# Patient Record
Sex: MALE | Race: BLACK OR AFRICAN AMERICAN | Employment: FULL TIME | URBAN - METROPOLITAN AREA
[De-identification: names, ages, dates, MRNs, and addresses within clinical notes are randomized per-mention and may not be internally consistent; named-entity substitution may affect disease eponyms.]

---

## 2019-02-04 ENCOUNTER — HOSPITAL ENCOUNTER (OUTPATIENT)
Dept: PREADMISSION TESTING | Age: 54
Discharge: HOME | End: 2019-02-04
Payer: COMMERCIAL

## 2019-02-04 VITALS
HEART RATE: 66 BPM | BODY MASS INDEX: 36.77 KG/M2 | DIASTOLIC BLOOD PRESSURE: 84 MMHG | WEIGHT: 248.25 LBS | HEIGHT: 69 IN | SYSTOLIC BLOOD PRESSURE: 144 MMHG | TEMPERATURE: 97.6 F

## 2019-02-04 LAB
ABO + RH BLD: NORMAL
ANION GAP SERPL CALC-SCNC: 10 MMOL/L (ref 5–15)
APPEARANCE UR: CLEAR
ATRIAL RATE: 62 BPM
BACTERIA URNS QL MICRO: NEGATIVE /HPF
BILIRUB UR QL: NEGATIVE
BLOOD GROUP ANTIBODIES SERPL: NORMAL
BUN SERPL-MCNC: 15 MG/DL (ref 6–20)
BUN/CREAT SERPL: 16 (ref 12–20)
CALCIUM SERPL-MCNC: 9.1 MG/DL (ref 8.5–10.1)
CALCULATED P AXIS, ECG09: 34 DEGREES
CALCULATED R AXIS, ECG10: 19 DEGREES
CALCULATED T AXIS, ECG11: 9 DEGREES
CHLORIDE SERPL-SCNC: 107 MMOL/L (ref 97–108)
CO2 SERPL-SCNC: 26 MMOL/L (ref 21–32)
COLOR UR: NORMAL
CREAT SERPL-MCNC: 0.96 MG/DL (ref 0.7–1.3)
DIAGNOSIS, 93000: NORMAL
EPITH CASTS URNS QL MICRO: NORMAL /LPF
ERYTHROCYTE [DISTWIDTH] IN BLOOD BY AUTOMATED COUNT: 11.9 % (ref 11.5–14.5)
EST. AVERAGE GLUCOSE BLD GHB EST-MCNC: NORMAL MG/DL
GLUCOSE SERPL-MCNC: 93 MG/DL (ref 65–100)
GLUCOSE UR STRIP.AUTO-MCNC: NEGATIVE MG/DL
HBA1C MFR BLD: 4.7 % (ref 4.2–6.3)
HCT VFR BLD AUTO: 37.9 % (ref 36.6–50.3)
HGB BLD-MCNC: 12 G/DL (ref 12.1–17)
HGB UR QL STRIP: NEGATIVE
HYALINE CASTS URNS QL MICRO: NORMAL /LPF (ref 0–5)
INR PPP: 1.1 (ref 0.9–1.1)
KETONES UR QL STRIP.AUTO: NEGATIVE MG/DL
LEUKOCYTE ESTERASE UR QL STRIP.AUTO: NEGATIVE
MCH RBC QN AUTO: 28.1 PG (ref 26–34)
MCHC RBC AUTO-ENTMCNC: 31.7 G/DL (ref 30–36.5)
MCV RBC AUTO: 88.8 FL (ref 80–99)
NITRITE UR QL STRIP.AUTO: NEGATIVE
NRBC # BLD: 0 K/UL (ref 0–0.01)
NRBC BLD-RTO: 0 PER 100 WBC
P-R INTERVAL, ECG05: 132 MS
PH UR STRIP: 5.5 [PH] (ref 5–8)
PLATELET # BLD AUTO: 210 K/UL (ref 150–400)
PMV BLD AUTO: 11.3 FL (ref 8.9–12.9)
POTASSIUM SERPL-SCNC: 4.2 MMOL/L (ref 3.5–5.1)
PROT UR STRIP-MCNC: NEGATIVE MG/DL
PROTHROMBIN TIME: 10.9 SEC (ref 9–11.1)
Q-T INTERVAL, ECG07: 448 MS
QRS DURATION, ECG06: 88 MS
QTC CALCULATION (BEZET), ECG08: 454 MS
RBC # BLD AUTO: 4.27 M/UL (ref 4.1–5.7)
RBC #/AREA URNS HPF: NORMAL /HPF (ref 0–5)
SODIUM SERPL-SCNC: 143 MMOL/L (ref 136–145)
SP GR UR REFRACTOMETRY: 1.02 (ref 1–1.03)
SPECIMEN EXP DATE BLD: NORMAL
UA: UC IF INDICATED,UAUC: NORMAL
UROBILINOGEN UR QL STRIP.AUTO: 0.2 EU/DL (ref 0.2–1)
VENTRICULAR RATE, ECG03: 62 BPM
WBC # BLD AUTO: 4.1 K/UL (ref 4.1–11.1)
WBC URNS QL MICRO: NORMAL /HPF (ref 0–4)

## 2019-02-04 PROCEDURE — 81001 URINALYSIS AUTO W/SCOPE: CPT

## 2019-02-04 PROCEDURE — 85027 COMPLETE CBC AUTOMATED: CPT

## 2019-02-04 PROCEDURE — 36415 COLL VENOUS BLD VENIPUNCTURE: CPT

## 2019-02-04 PROCEDURE — 85610 PROTHROMBIN TIME: CPT

## 2019-02-04 PROCEDURE — 80048 BASIC METABOLIC PNL TOTAL CA: CPT

## 2019-02-04 PROCEDURE — 93005 ELECTROCARDIOGRAM TRACING: CPT

## 2019-02-04 PROCEDURE — 83036 HEMOGLOBIN GLYCOSYLATED A1C: CPT

## 2019-02-04 PROCEDURE — 86900 BLOOD TYPING SEROLOGIC ABO: CPT

## 2019-02-04 RX ORDER — COLCHICINE 0.6 MG/1
0.12 TABLET ORAL DAILY
COMMUNITY

## 2019-02-04 RX ORDER — PREDNISONE 5 MG/1
5 TABLET ORAL
COMMUNITY

## 2019-02-04 RX ORDER — AMPICILLIN TRIHYDRATE 250 MG
600 CAPSULE ORAL
COMMUNITY

## 2019-02-04 RX ORDER — ALLOPURINOL 300 MG/1
400 TABLET ORAL DAILY
COMMUNITY

## 2019-02-04 RX ORDER — ATORVASTATIN CALCIUM 10 MG/1
10 TABLET, FILM COATED ORAL DAILY
COMMUNITY

## 2019-02-05 LAB
BACTERIA SPEC CULT: NORMAL
BACTERIA SPEC CULT: NORMAL
SERVICE CMNT-IMP: NORMAL

## 2019-02-27 NOTE — H&P
PCP: Law Ruiz MD 
There is no problem list on file for this patient. 
  
  
Subjective: Chief Complaint: Pain of the Left Knee and Pain of the Right Knee 
  
  
HPI: Augusto Castro is a 47 y.o. male Who comes in today with bilateral knee pain. The patient is well known to me dating back to work related injuries to both knees. He injured 1 knee in 2004 in the other knee in 2005. I did arthroscopy on both knees and did a medial meniscectomy. He has continued to have difficulties with both knees over the years. We have injected his knees on multiple occasions. I reviewed her old records which only go back to 2010. I injected his knees in 2010 as well as in 2013. He tells me that at that time he was having difficulty getting his visits approved by worker's compensation and therefore he went through his own insurance. He really has not returned regarding his knees because as he did not want to use his own insurance. He feels is if both knees have become progressively more arthritic over the years and are due to his work related injuries. 
  
  
Objective:  
  
There were no vitals filed for this visit. Height: 5' 9\" Wt Readings from Last 3 Encounters:  
08/09/18 245 lb  
04/09/18 242 lb  
  
Body mass index is 36.18 kg/m². 
  
Musculoskeletal :  He has a full range of motion of bilateral hips and knees. He has varus deformity of bilateral knees with medial crepitus. He has a positive patellofemoral grind bilaterally. There is no significant effusion. His ligaments are stable. His skin is healthy and intact. He has a strong pedal pulse. He has no pedal edema. He has no apparent atrophy. 
  
Procedures:   
Large Joint Arthrocentesis Consent given by: patient Timeout: Immediately prior to procedure a time out was called to verify the correct patient, procedure, equipment, support staff and site/side marked as required Supporting Documentation Indications: pain Procedure Details Location: knee - L knee Needle size: 22 G Approach: anterolateral 
Patient tolerance: patient tolerated the procedure well with no immediate complications 
  
  
Medications administered: 1 mL Betamethasone 6 (3-3) MG/ML; 2 mL bupivacaine 0.5 % Large Joint Arthrocentesis Consent given by: patient Timeout: Immediately prior to procedure a time out was called to verify the correct patient, procedure, equipment, support staff and site/side marked as required Supporting Documentation Indications: pain Procedure Details Location: knee - R knee Needle size: 22 G Approach: anterolateral 
Patient tolerance: patient tolerated the procedure well with no immediate complications 
  
  
Medications administered: 1 mL Betamethasone 6 (3-3) MG/ML; 2 mL bupivacaine 0.5 %   
  
  
Radiographs:   
 Order: XR KNEE 3 VW LEFT - Indication: Primary localized osteoarthritis  
of knees, bilateral 
Order: XR KNEE 3 VW RIGHT - Indication: Primary localized osteoarthritis  
of knees, bilateral 
  
  
X-ray Knee Left 3 Views 
  
Result Date: 8/10/2018 
  
Views: Standing AP, [place], Lat.  
  
Notes Impression: Three view x-rays of the left knee show varus deformity with  
medial joint space narrowing with near bone on bone arthritis medially. He  
has significant degenerative changes of the patellofemoral joint as well. 
  
  
X-ray Knee Right 3 Views 
  
Result Date: 8/10/2018 
  
Views: Standing AP, Lat, [place].  
  
Notes Impression: Three view x-rays of the right knee show varus deformity with  
near bone on bone arthritis of the medial compartment as well as  
significant degenerative changes of the patellofemoral joint. 
  
   
  
Assessment:  
  
1. Primary localized osteoarthritis of knees, bilateral   
2. Obesity, Class II, BMI 35-39.9   
  
  
Plan: I reviewed his x-ray findings with him. He has end-stage osteoarthritis of bilateral knees. We discussed treatment options.  Clearly his arthritis is significant enough to warrant knee replacement surgery. He has had multiple steroid injections over the years. I went over knee replacement surgery at length with him including expected outcomes and postop recovery as well as risks and complications specifically DVT, PE, infection. After much discussion he would like to get the process rolling and see if we can get his knee replacements covered by his worker's compensation claim. He would like to start with the right knee. I injected each knee today. I explained that we cannot consider knee replacement surgery into the least 2 months after his injections today. He fully understands this. 
  
   
Orders Placed This Encounter  X-ray knee left 3 views  X-ray knee right 3 views  BMI >=25 PATIENT INSTRUCTIONS & EDUCATION Return for scheduled surgery.  
  
  
Jason James MD  
8/10/2018 
3:08 PM 
 
 
 
Date of Surgery Update: 
Sesar Monterroso was seen and examined. Past Medical History:  
Diagnosis Date  Arthritis  Hypercholesteremia  Thyroid disease ENLARGED THYROID Prior to Admission Medications Prescriptions Last Dose Informant Patient Reported? Taking? Chlorphenir-Phenylephrn-Aspirn (SANDY-SELTZER PLUS COLD, PE,) 2-7.8-325 mg tbef 2019  Yes No  
Sig: Take 1 Tab by mouth daily as needed. allopurinol (ZYLOPRIM) 300 mg tablet 3/3/2019 at Unknown time  Yes Yes Sig: Take 400 mg by mouth daily. atorvastatin (LIPITOR) 10 mg tablet 3/3/2019 at Unknown time  Yes Yes Sig: Take 10 mg by mouth daily. colchicine 0.6 mg tablet 3/3/2019 at Unknown time  Yes Yes Sig: Take 0.12 mg by mouth daily. phenylephrine (NEOSYNEPHRINE) 0.5 % spry 3/4/2019 at Unknown time  Yes Yes Si Sprays by Both Nostrils route every six (6) hours as needed. predniSONE (DELTASONE) 5 mg tablet 2019  Yes No  
Sig: Take 5 mg by mouth daily as needed. red yeast rice extract 600 mg cap 2019 at Unknown time  Yes Yes Sig: Take 600 mg by mouth now. Facility-Administered Medications: None Allergy to:Patient has no known allergies. Physical Examination: General appearance - alert, well appearing, and in no distress Chest - clear to auscultation, no wheezes, rales or rhonchi, symmetric air entry Heart - normal rate and regular rhythm Abdomen - soft, nontender, nondistended, no masses or organomegaly History and physical has been reviewed. The patient has been examined. There have been no significant clinical changes since the completion of the originally dated History and Physical. 
 
Signed By: Mary Shin PA-C  March 4, 2019 7:24 AM

## 2019-03-04 ENCOUNTER — HOSPITAL ENCOUNTER (INPATIENT)
Age: 54
LOS: 1 days | Discharge: HOME HEALTH | DRG: 470 | End: 2019-03-05
Attending: ORTHOPAEDIC SURGERY | Admitting: ORTHOPAEDIC SURGERY
Payer: COMMERCIAL

## 2019-03-04 ENCOUNTER — ANESTHESIA (OUTPATIENT)
Dept: SURGERY | Age: 54
DRG: 470 | End: 2019-03-04
Payer: COMMERCIAL

## 2019-03-04 ENCOUNTER — ANESTHESIA EVENT (OUTPATIENT)
Dept: SURGERY | Age: 54
DRG: 470 | End: 2019-03-04
Payer: COMMERCIAL

## 2019-03-04 DIAGNOSIS — M17.11 PRIMARY OSTEOARTHRITIS OF RIGHT KNEE: Primary | ICD-10-CM

## 2019-03-04 LAB
ABO + RH BLD: NORMAL
BLOOD GROUP ANTIBODIES SERPL: NORMAL
GLUCOSE BLD STRIP.AUTO-MCNC: 103 MG/DL (ref 65–100)
SERVICE CMNT-IMP: ABNORMAL
SPECIMEN EXP DATE BLD: NORMAL

## 2019-03-04 PROCEDURE — 77030000032 HC CUF TRNQT ZIMM -B: Performed by: ORTHOPAEDIC SURGERY

## 2019-03-04 PROCEDURE — 77030035236 HC SUT PDS STRATFX BARB J&J -B: Performed by: ORTHOPAEDIC SURGERY

## 2019-03-04 PROCEDURE — 77030003601 HC NDL NRV BLK BBMI -A

## 2019-03-04 PROCEDURE — 65270000029 HC RM PRIVATE

## 2019-03-04 PROCEDURE — 76210000016 HC OR PH I REC 1 TO 1.5 HR: Performed by: ORTHOPAEDIC SURGERY

## 2019-03-04 PROCEDURE — 74011000250 HC RX REV CODE- 250: Performed by: ORTHOPAEDIC SURGERY

## 2019-03-04 PROCEDURE — 0SRC0J9 REPLACEMENT OF RIGHT KNEE JOINT WITH SYNTHETIC SUBSTITUTE, CEMENTED, OPEN APPROACH: ICD-10-PCS | Performed by: ORTHOPAEDIC SURGERY

## 2019-03-04 PROCEDURE — 97116 GAIT TRAINING THERAPY: CPT

## 2019-03-04 PROCEDURE — 77030034850: Performed by: ORTHOPAEDIC SURGERY

## 2019-03-04 PROCEDURE — 77030020782 HC GWN BAIR PAWS FLX 3M -B

## 2019-03-04 PROCEDURE — 36415 COLL VENOUS BLD VENIPUNCTURE: CPT

## 2019-03-04 PROCEDURE — 74011000258 HC RX REV CODE- 258: Performed by: PHYSICIAN ASSISTANT

## 2019-03-04 PROCEDURE — 74011250636 HC RX REV CODE- 250/636

## 2019-03-04 PROCEDURE — 77030007866 HC KT SPN ANES BBMI -B: Performed by: NURSE ANESTHETIST, CERTIFIED REGISTERED

## 2019-03-04 PROCEDURE — 74011250636 HC RX REV CODE- 250/636: Performed by: PHYSICIAN ASSISTANT

## 2019-03-04 PROCEDURE — 74011250636 HC RX REV CODE- 250/636: Performed by: ANESTHESIOLOGY

## 2019-03-04 PROCEDURE — 77030018836 HC SOL IRR NACL ICUM -A: Performed by: ORTHOPAEDIC SURGERY

## 2019-03-04 PROCEDURE — 77030006822 HC BLD SAW SAG BRSM -B: Performed by: ORTHOPAEDIC SURGERY

## 2019-03-04 PROCEDURE — 77030027138 HC INCENT SPIROMETER -A

## 2019-03-04 PROCEDURE — 77030008467 HC STPLR SKN COVD -B: Performed by: ORTHOPAEDIC SURGERY

## 2019-03-04 PROCEDURE — 74011000250 HC RX REV CODE- 250: Performed by: PHYSICIAN ASSISTANT

## 2019-03-04 PROCEDURE — 77030018846 HC SOL IRR STRL H20 ICUM -A: Performed by: ORTHOPAEDIC SURGERY

## 2019-03-04 PROCEDURE — C1713 ANCHOR/SCREW BN/BN,TIS/BN: HCPCS | Performed by: ORTHOPAEDIC SURGERY

## 2019-03-04 PROCEDURE — 82962 GLUCOSE BLOOD TEST: CPT

## 2019-03-04 PROCEDURE — 97161 PT EVAL LOW COMPLEX 20 MIN: CPT

## 2019-03-04 PROCEDURE — 74011000250 HC RX REV CODE- 250

## 2019-03-04 PROCEDURE — 77030018822 HC SLV COMPR FT COVD -B

## 2019-03-04 PROCEDURE — 76060000035 HC ANESTHESIA 2 TO 2.5 HR: Performed by: ORTHOPAEDIC SURGERY

## 2019-03-04 PROCEDURE — C1776 JOINT DEVICE (IMPLANTABLE): HCPCS | Performed by: ORTHOPAEDIC SURGERY

## 2019-03-04 PROCEDURE — 74011250637 HC RX REV CODE- 250/637: Performed by: PHYSICIAN ASSISTANT

## 2019-03-04 PROCEDURE — 86900 BLOOD TYPING SEROLOGIC ABO: CPT

## 2019-03-04 PROCEDURE — 76010000171 HC OR TIME 2 TO 2.5 HR INTENSV-TIER 1: Performed by: ORTHOPAEDIC SURGERY

## 2019-03-04 PROCEDURE — 77030028907 HC WRP KNEE WO BGS SOLM -B

## 2019-03-04 PROCEDURE — 77030011640 HC PAD GRND REM COVD -A: Performed by: ORTHOPAEDIC SURGERY

## 2019-03-04 PROCEDURE — 77030031139 HC SUT VCRL2 J&J -A: Performed by: ORTHOPAEDIC SURGERY

## 2019-03-04 PROCEDURE — 77030014077 HC TOWER MX CEM J&J -C: Performed by: ORTHOPAEDIC SURGERY

## 2019-03-04 DEVICE — Z DUP USE 2503045 BASEPLATE TIB SZ 7 AP51MM ML82MM KNEE PC STEM COMPLT SOL: Type: IMPLANTABLE DEVICE | Site: KNEE | Status: FUNCTIONAL

## 2019-03-04 DEVICE — KIT TKR CEM FLX: Type: IMPLANTABLE DEVICE | Site: KNEE | Status: FUNCTIONAL

## 2019-03-04 DEVICE — Z DUP USE 2437985 COMPONENT FEM SZ G R KNEE POST STBL COMPATIBLE W/ LPS-FLEX: Type: IMPLANTABLE DEVICE | Site: KNEE | Status: FUNCTIONAL

## 2019-03-04 DEVICE — Z DUP USE 2438083 COMPONENT PAT DIA41MM THK10MM STD UHMWPE NXGN: Type: IMPLANTABLE DEVICE | Site: KNEE | Status: FUNCTIONAL

## 2019-03-04 DEVICE — CEMENT BNE 40GM FULL DOSE PMMA W/O ANTIBIO HI VISC N RADPQ: Type: IMPLANTABLE DEVICE | Site: KNEE | Status: FUNCTIONAL

## 2019-03-04 DEVICE — PLUG STEM TIV FLX TAPR FOR MG II ST BNE SCR NXGN: Type: IMPLANTABLE DEVICE | Site: KNEE | Status: FUNCTIONAL

## 2019-03-04 DEVICE — IMPLANTABLE DEVICE: Type: IMPLANTABLE DEVICE | Site: KNEE | Status: FUNCTIONAL

## 2019-03-04 RX ORDER — MORPHINE SULFATE 10 MG/ML
2 INJECTION, SOLUTION INTRAMUSCULAR; INTRAVENOUS
Status: DISCONTINUED | OUTPATIENT
Start: 2019-03-04 | End: 2019-03-04 | Stop reason: HOSPADM

## 2019-03-04 RX ORDER — PROPOFOL 10 MG/ML
INJECTION, EMULSION INTRAVENOUS
Status: DISCONTINUED | OUTPATIENT
Start: 2019-03-04 | End: 2019-03-04 | Stop reason: HOSPADM

## 2019-03-04 RX ORDER — MIDAZOLAM HYDROCHLORIDE 1 MG/ML
INJECTION, SOLUTION INTRAMUSCULAR; INTRAVENOUS AS NEEDED
Status: DISCONTINUED | OUTPATIENT
Start: 2019-03-04 | End: 2019-03-04 | Stop reason: HOSPADM

## 2019-03-04 RX ORDER — SODIUM CHLORIDE 0.9 % (FLUSH) 0.9 %
5-40 SYRINGE (ML) INJECTION EVERY 8 HOURS
Status: DISCONTINUED | OUTPATIENT
Start: 2019-03-04 | End: 2019-03-04 | Stop reason: HOSPADM

## 2019-03-04 RX ORDER — POLYETHYLENE GLYCOL 3350 17 G/17G
17 POWDER, FOR SOLUTION ORAL DAILY
Status: DISCONTINUED | OUTPATIENT
Start: 2019-03-05 | End: 2019-03-05 | Stop reason: HOSPADM

## 2019-03-04 RX ORDER — SODIUM CHLORIDE 0.9 % (FLUSH) 0.9 %
5-40 SYRINGE (ML) INJECTION AS NEEDED
Status: DISCONTINUED | OUTPATIENT
Start: 2019-03-04 | End: 2019-03-04 | Stop reason: HOSPADM

## 2019-03-04 RX ORDER — ATORVASTATIN CALCIUM 10 MG/1
10 TABLET, FILM COATED ORAL DAILY
Status: DISCONTINUED | OUTPATIENT
Start: 2019-03-05 | End: 2019-03-05 | Stop reason: HOSPADM

## 2019-03-04 RX ORDER — FACIAL-BODY WIPES
10 EACH TOPICAL DAILY PRN
Status: DISCONTINUED | OUTPATIENT
Start: 2019-03-06 | End: 2019-03-05 | Stop reason: HOSPADM

## 2019-03-04 RX ORDER — DEXAMETHASONE SODIUM PHOSPHATE 4 MG/ML
INJECTION, SOLUTION INTRA-ARTICULAR; INTRALESIONAL; INTRAMUSCULAR; INTRAVENOUS; SOFT TISSUE AS NEEDED
Status: DISCONTINUED | OUTPATIENT
Start: 2019-03-04 | End: 2019-03-04 | Stop reason: HOSPADM

## 2019-03-04 RX ORDER — FAMOTIDINE 20 MG/1
20 TABLET, FILM COATED ORAL 2 TIMES DAILY
Status: DISCONTINUED | OUTPATIENT
Start: 2019-03-04 | End: 2019-03-05 | Stop reason: HOSPADM

## 2019-03-04 RX ORDER — FENTANYL CITRATE 50 UG/ML
25 INJECTION, SOLUTION INTRAMUSCULAR; INTRAVENOUS
Status: DISCONTINUED | OUTPATIENT
Start: 2019-03-04 | End: 2019-03-04 | Stop reason: HOSPADM

## 2019-03-04 RX ORDER — HYDROXYZINE HYDROCHLORIDE 10 MG/1
10 TABLET, FILM COATED ORAL
Status: DISCONTINUED | OUTPATIENT
Start: 2019-03-04 | End: 2019-03-05 | Stop reason: HOSPADM

## 2019-03-04 RX ORDER — DEXMEDETOMIDINE HYDROCHLORIDE 4 UG/ML
INJECTION, SOLUTION INTRAVENOUS AS NEEDED
Status: DISCONTINUED | OUTPATIENT
Start: 2019-03-04 | End: 2019-03-04 | Stop reason: HOSPADM

## 2019-03-04 RX ORDER — FENTANYL CITRATE 50 UG/ML
50 INJECTION, SOLUTION INTRAMUSCULAR; INTRAVENOUS AS NEEDED
Status: DISCONTINUED | OUTPATIENT
Start: 2019-03-04 | End: 2019-03-04 | Stop reason: HOSPADM

## 2019-03-04 RX ORDER — SODIUM CHLORIDE 0.9 % (FLUSH) 0.9 %
5-40 SYRINGE (ML) INJECTION EVERY 8 HOURS
Status: DISCONTINUED | OUTPATIENT
Start: 2019-03-04 | End: 2019-03-05 | Stop reason: HOSPADM

## 2019-03-04 RX ORDER — ACETAMINOPHEN 500 MG
1000 TABLET ORAL EVERY 6 HOURS
Status: DISCONTINUED | OUTPATIENT
Start: 2019-03-04 | End: 2019-03-05 | Stop reason: HOSPADM

## 2019-03-04 RX ORDER — ROPIVACAINE HYDROCHLORIDE 5 MG/ML
30 INJECTION, SOLUTION EPIDURAL; INFILTRATION; PERINEURAL AS NEEDED
Status: DISCONTINUED | OUTPATIENT
Start: 2019-03-04 | End: 2019-03-04 | Stop reason: HOSPADM

## 2019-03-04 RX ORDER — ONDANSETRON 2 MG/ML
4 INJECTION INTRAMUSCULAR; INTRAVENOUS
Status: ACTIVE | OUTPATIENT
Start: 2019-03-04 | End: 2019-03-05

## 2019-03-04 RX ORDER — OXYCODONE HYDROCHLORIDE 5 MG/1
5 TABLET ORAL AS NEEDED
Status: DISCONTINUED | OUTPATIENT
Start: 2019-03-04 | End: 2019-03-04 | Stop reason: HOSPADM

## 2019-03-04 RX ORDER — ROPIVACAINE HYDROCHLORIDE 5 MG/ML
INJECTION, SOLUTION EPIDURAL; INFILTRATION; PERINEURAL
Status: COMPLETED | OUTPATIENT
Start: 2019-03-04 | End: 2019-03-04

## 2019-03-04 RX ORDER — PROPOFOL 10 MG/ML
INJECTION, EMULSION INTRAVENOUS AS NEEDED
Status: DISCONTINUED | OUTPATIENT
Start: 2019-03-04 | End: 2019-03-04 | Stop reason: HOSPADM

## 2019-03-04 RX ORDER — OXYCODONE HYDROCHLORIDE 5 MG/1
10 TABLET ORAL
Status: DISCONTINUED | OUTPATIENT
Start: 2019-03-04 | End: 2019-03-05 | Stop reason: HOSPADM

## 2019-03-04 RX ORDER — MELOXICAM 7.5 MG/1
15 TABLET ORAL DAILY
Status: DISCONTINUED | OUTPATIENT
Start: 2019-03-05 | End: 2019-03-05 | Stop reason: HOSPADM

## 2019-03-04 RX ORDER — MIDAZOLAM HYDROCHLORIDE 1 MG/ML
1 INJECTION, SOLUTION INTRAMUSCULAR; INTRAVENOUS AS NEEDED
Status: DISCONTINUED | OUTPATIENT
Start: 2019-03-04 | End: 2019-03-04 | Stop reason: HOSPADM

## 2019-03-04 RX ORDER — SODIUM CHLORIDE 0.9 % (FLUSH) 0.9 %
5-40 SYRINGE (ML) INJECTION AS NEEDED
Status: DISCONTINUED | OUTPATIENT
Start: 2019-03-04 | End: 2019-03-05 | Stop reason: HOSPADM

## 2019-03-04 RX ORDER — SODIUM CHLORIDE 9 MG/ML
125 INJECTION, SOLUTION INTRAVENOUS CONTINUOUS
Status: DISPENSED | OUTPATIENT
Start: 2019-03-04 | End: 2019-03-05

## 2019-03-04 RX ORDER — SODIUM CHLORIDE, SODIUM LACTATE, POTASSIUM CHLORIDE, CALCIUM CHLORIDE 600; 310; 30; 20 MG/100ML; MG/100ML; MG/100ML; MG/100ML
INJECTION, SOLUTION INTRAVENOUS
Status: DISCONTINUED | OUTPATIENT
Start: 2019-03-04 | End: 2019-03-04 | Stop reason: HOSPADM

## 2019-03-04 RX ORDER — AMOXICILLIN 250 MG
1 CAPSULE ORAL 2 TIMES DAILY
Status: DISCONTINUED | OUTPATIENT
Start: 2019-03-04 | End: 2019-03-05 | Stop reason: HOSPADM

## 2019-03-04 RX ORDER — MORPHINE SULFATE 2 MG/ML
2 INJECTION, SOLUTION INTRAMUSCULAR; INTRAVENOUS
Status: DISPENSED | OUTPATIENT
Start: 2019-03-04 | End: 2019-03-05

## 2019-03-04 RX ORDER — HYDROMORPHONE HYDROCHLORIDE 1 MG/ML
0.2 INJECTION, SOLUTION INTRAMUSCULAR; INTRAVENOUS; SUBCUTANEOUS
Status: DISCONTINUED | OUTPATIENT
Start: 2019-03-04 | End: 2019-03-04 | Stop reason: HOSPADM

## 2019-03-04 RX ORDER — OXYCODONE HYDROCHLORIDE 5 MG/1
5 TABLET ORAL
Status: DISCONTINUED | OUTPATIENT
Start: 2019-03-04 | End: 2019-03-05 | Stop reason: HOSPADM

## 2019-03-04 RX ORDER — SODIUM CHLORIDE 9 MG/ML
25 INJECTION, SOLUTION INTRAVENOUS CONTINUOUS
Status: DISCONTINUED | OUTPATIENT
Start: 2019-03-04 | End: 2019-03-04 | Stop reason: HOSPADM

## 2019-03-04 RX ORDER — CEFAZOLIN SODIUM/WATER 2 G/20 ML
2 SYRINGE (ML) INTRAVENOUS ONCE
Status: COMPLETED | OUTPATIENT
Start: 2019-03-04 | End: 2019-03-04

## 2019-03-04 RX ORDER — SODIUM CHLORIDE, SODIUM LACTATE, POTASSIUM CHLORIDE, CALCIUM CHLORIDE 600; 310; 30; 20 MG/100ML; MG/100ML; MG/100ML; MG/100ML
100 INJECTION, SOLUTION INTRAVENOUS CONTINUOUS
Status: DISCONTINUED | OUTPATIENT
Start: 2019-03-04 | End: 2019-03-04 | Stop reason: HOSPADM

## 2019-03-04 RX ORDER — ONDANSETRON 2 MG/ML
4 INJECTION INTRAMUSCULAR; INTRAVENOUS AS NEEDED
Status: DISCONTINUED | OUTPATIENT
Start: 2019-03-04 | End: 2019-03-04 | Stop reason: HOSPADM

## 2019-03-04 RX ORDER — ONDANSETRON 2 MG/ML
INJECTION INTRAMUSCULAR; INTRAVENOUS AS NEEDED
Status: DISCONTINUED | OUTPATIENT
Start: 2019-03-04 | End: 2019-03-04 | Stop reason: HOSPADM

## 2019-03-04 RX ORDER — NALOXONE HYDROCHLORIDE 0.4 MG/ML
0.4 INJECTION, SOLUTION INTRAMUSCULAR; INTRAVENOUS; SUBCUTANEOUS AS NEEDED
Status: DISCONTINUED | OUTPATIENT
Start: 2019-03-04 | End: 2019-03-05 | Stop reason: HOSPADM

## 2019-03-04 RX ORDER — COLCHICINE 0.6 MG/1
0.6 TABLET ORAL DAILY
Status: DISCONTINUED | OUTPATIENT
Start: 2019-03-05 | End: 2019-03-05 | Stop reason: HOSPADM

## 2019-03-04 RX ORDER — BUPIVACAINE HYDROCHLORIDE 5 MG/ML
INJECTION, SOLUTION EPIDURAL; INTRACAUDAL AS NEEDED
Status: DISCONTINUED | OUTPATIENT
Start: 2019-03-04 | End: 2019-03-04 | Stop reason: HOSPADM

## 2019-03-04 RX ORDER — ACETAMINOPHEN 500 MG
1000 TABLET ORAL ONCE
Status: COMPLETED | OUTPATIENT
Start: 2019-03-04 | End: 2019-03-04

## 2019-03-04 RX ORDER — DEXMEDETOMIDINE HYDROCHLORIDE 4 UG/ML
INJECTION, SOLUTION INTRAVENOUS
Status: DISCONTINUED | OUTPATIENT
Start: 2019-03-04 | End: 2019-03-04 | Stop reason: HOSPADM

## 2019-03-04 RX ORDER — SODIUM CHLORIDE, SODIUM LACTATE, POTASSIUM CHLORIDE, CALCIUM CHLORIDE 600; 310; 30; 20 MG/100ML; MG/100ML; MG/100ML; MG/100ML
25 INJECTION, SOLUTION INTRAVENOUS CONTINUOUS
Status: DISCONTINUED | OUTPATIENT
Start: 2019-03-04 | End: 2019-03-04 | Stop reason: HOSPADM

## 2019-03-04 RX ORDER — MIDAZOLAM HYDROCHLORIDE 1 MG/ML
0.5 INJECTION, SOLUTION INTRAMUSCULAR; INTRAVENOUS
Status: DISCONTINUED | OUTPATIENT
Start: 2019-03-04 | End: 2019-03-04 | Stop reason: HOSPADM

## 2019-03-04 RX ORDER — CEFAZOLIN SODIUM/WATER 2 G/20 ML
2 SYRINGE (ML) INTRAVENOUS EVERY 8 HOURS
Status: COMPLETED | OUTPATIENT
Start: 2019-03-04 | End: 2019-03-05

## 2019-03-04 RX ORDER — LIDOCAINE HYDROCHLORIDE 10 MG/ML
0.1 INJECTION, SOLUTION EPIDURAL; INFILTRATION; INTRACAUDAL; PERINEURAL AS NEEDED
Status: DISCONTINUED | OUTPATIENT
Start: 2019-03-04 | End: 2019-03-04 | Stop reason: HOSPADM

## 2019-03-04 RX ORDER — PREGABALIN 75 MG/1
75 CAPSULE ORAL ONCE
Status: COMPLETED | OUTPATIENT
Start: 2019-03-04 | End: 2019-03-04

## 2019-03-04 RX ORDER — ONDANSETRON 4 MG/1
4 TABLET, ORALLY DISINTEGRATING ORAL
Status: DISCONTINUED | OUTPATIENT
Start: 2019-03-04 | End: 2019-03-05 | Stop reason: HOSPADM

## 2019-03-04 RX ORDER — CELECOXIB 200 MG/1
200 CAPSULE ORAL ONCE
Status: COMPLETED | OUTPATIENT
Start: 2019-03-04 | End: 2019-03-04

## 2019-03-04 RX ORDER — DIPHENHYDRAMINE HYDROCHLORIDE 50 MG/ML
12.5 INJECTION, SOLUTION INTRAMUSCULAR; INTRAVENOUS AS NEEDED
Status: DISCONTINUED | OUTPATIENT
Start: 2019-03-04 | End: 2019-03-04 | Stop reason: HOSPADM

## 2019-03-04 RX ADMIN — DEXMEDETOMIDINE HYDROCHLORIDE 0.6 MCG/KG/HR: 4 INJECTION, SOLUTION INTRAVENOUS at 07:28

## 2019-03-04 RX ADMIN — FAMOTIDINE 20 MG: 20 TABLET ORAL at 18:30

## 2019-03-04 RX ADMIN — PROPOFOL: 10 INJECTION, EMULSION INTRAVENOUS at 09:00

## 2019-03-04 RX ADMIN — BUPIVACAINE HYDROCHLORIDE 8 MG: 5 INJECTION, SOLUTION EPIDURAL; INTRACAUDAL at 07:29

## 2019-03-04 RX ADMIN — ACETAMINOPHEN 1000 MG: 500 TABLET ORAL at 18:30

## 2019-03-04 RX ADMIN — RIVAROXABAN 10 MG: 10 TABLET, FILM COATED ORAL at 18:29

## 2019-03-04 RX ADMIN — Medication 2 G: at 07:36

## 2019-03-04 RX ADMIN — OXYCODONE HYDROCHLORIDE 10 MG: 5 TABLET ORAL at 11:59

## 2019-03-04 RX ADMIN — PREGABALIN 75 MG: 75 CAPSULE ORAL at 06:47

## 2019-03-04 RX ADMIN — MORPHINE SULFATE 1 MG: 2 INJECTION, SOLUTION INTRAMUSCULAR; INTRAVENOUS at 11:17

## 2019-03-04 RX ADMIN — DEXMEDETOMIDINE HYDROCHLORIDE 8 MCG: 4 INJECTION, SOLUTION INTRAVENOUS at 07:26

## 2019-03-04 RX ADMIN — PROPOFOL 25 MCG/KG/MIN: 10 INJECTION, EMULSION INTRAVENOUS at 07:28

## 2019-03-04 RX ADMIN — SODIUM CHLORIDE 125 ML/HR: 900 INJECTION, SOLUTION INTRAVENOUS at 10:35

## 2019-03-04 RX ADMIN — DEXMEDETOMIDINE HYDROCHLORIDE 4 MCG: 4 INJECTION, SOLUTION INTRAVENOUS at 07:19

## 2019-03-04 RX ADMIN — DEXAMETHASONE SODIUM PHOSPHATE 4 MG: 4 INJECTION, SOLUTION INTRA-ARTICULAR; INTRALESIONAL; INTRAMUSCULAR; INTRAVENOUS; SOFT TISSUE at 07:49

## 2019-03-04 RX ADMIN — MIDAZOLAM HYDROCHLORIDE 2 MG: 1 INJECTION, SOLUTION INTRAMUSCULAR; INTRAVENOUS at 07:19

## 2019-03-04 RX ADMIN — ONDANSETRON 4 MG: 2 INJECTION INTRAMUSCULAR; INTRAVENOUS at 07:49

## 2019-03-04 RX ADMIN — TRANEXAMIC ACID 1 G: 100 INJECTION, SOLUTION INTRAVENOUS at 07:29

## 2019-03-04 RX ADMIN — PROPOFOL 20 MG: 10 INJECTION, EMULSION INTRAVENOUS at 07:28

## 2019-03-04 RX ADMIN — MIDAZOLAM HYDROCHLORIDE 2 MG: 1 INJECTION, SOLUTION INTRAMUSCULAR; INTRAVENOUS at 06:58

## 2019-03-04 RX ADMIN — DEXMEDETOMIDINE HYDROCHLORIDE 8 MCG: 4 INJECTION, SOLUTION INTRAVENOUS at 07:23

## 2019-03-04 RX ADMIN — MORPHINE SULFATE 1 MG: 2 INJECTION, SOLUTION INTRAMUSCULAR; INTRAVENOUS at 11:36

## 2019-03-04 RX ADMIN — OXYCODONE HYDROCHLORIDE 10 MG: 5 TABLET ORAL at 18:29

## 2019-03-04 RX ADMIN — OXYCODONE HYDROCHLORIDE 10 MG: 5 TABLET ORAL at 21:40

## 2019-03-04 RX ADMIN — SODIUM CHLORIDE, SODIUM LACTATE, POTASSIUM CHLORIDE, CALCIUM CHLORIDE: 600; 310; 30; 20 INJECTION, SOLUTION INTRAVENOUS at 08:59

## 2019-03-04 RX ADMIN — SODIUM CHLORIDE, SODIUM LACTATE, POTASSIUM CHLORIDE, AND CALCIUM CHLORIDE 25 ML/HR: 600; 310; 30; 20 INJECTION, SOLUTION INTRAVENOUS at 06:31

## 2019-03-04 RX ADMIN — PROPOFOL 10 MG: 10 INJECTION, EMULSION INTRAVENOUS at 08:31

## 2019-03-04 RX ADMIN — ACETAMINOPHEN 1000 MG: 500 TABLET ORAL at 06:47

## 2019-03-04 RX ADMIN — SODIUM CHLORIDE 125 ML/HR: 900 INJECTION, SOLUTION INTRAVENOUS at 18:28

## 2019-03-04 RX ADMIN — SENNOSIDES AND DOCUSATE SODIUM 1 TABLET: 8.6; 5 TABLET ORAL at 18:29

## 2019-03-04 RX ADMIN — ROPIVACAINE HYDROCHLORIDE 20 ML: 5 INJECTION, SOLUTION EPIDURAL; INFILTRATION; PERINEURAL at 07:02

## 2019-03-04 RX ADMIN — CELECOXIB 200 MG: 200 CAPSULE ORAL at 06:47

## 2019-03-04 RX ADMIN — OXYCODONE HYDROCHLORIDE 10 MG: 5 TABLET ORAL at 15:31

## 2019-03-04 RX ADMIN — Medication 2 G: at 15:31

## 2019-03-04 RX ADMIN — FENTANYL CITRATE 50 MCG: 50 INJECTION, SOLUTION INTRAMUSCULAR; INTRAVENOUS at 06:58

## 2019-03-04 RX ADMIN — ACETAMINOPHEN 1000 MG: 500 TABLET ORAL at 12:04

## 2019-03-04 NOTE — PROGRESS NOTES
Problem: Mobility Impaired (Adult and Pediatric) Goal: *Acute Goals and Plan of Care (Insert Text) Physical Therapy Goals Initiated 3/4/2019 1. Patient will move from supine to sit and sit to supine , scoot up and down and roll side to side in bed with modified independence within 4 days. 2. Patient will perform sit to stand with modified independence within 4 days. 3. Patient will ambulate with modified independence for 150 feet with the least restrictive device within 4 days. 4. Patient will ascend/descend 18 stairs with bilateral handrail(s) with contact guard assist within 4 days. 5. Patient will perform home exercise program per protocol with independence within 4 days. 6. Patient will demonstrate AROM 0-90 degrees in operative joint within 4 days. physical Therapy knee EVALUATION Patient: Veena Ospina (61 y.o. male) Date: 3/4/2019 Primary Diagnosis: Osteoarthritis of right knee, unspecified osteoarthritis type [M17.11] Primary osteoarthritis of right knee [M17.11] Procedure(s) (LRB): 
RIGHT TOTAL KNEE REPLACEMENT  (CHOICE) (Right) Day of Surgery Precautions:   Fall, WBAT 
 
ASSESSMENT : 
Based on the objective data described below, the patient presents with decreased independence with mobility compared to baseline level prior to admission due to decreased strength and ROM. Patient cleared by nursing for mobility and agreeable to participate in POD #0 mobility with encouragement from nursing, PA, and PT due to c/o pain. Patient vital signs within normal limits. Overall SBA-min A for mobility. Gait with step to pattern and decreased knee flexion throughout cycle. Physical therapist reviewed bed exercises and acute care expectations with patient, who became drowsy at end of session upon return to bed. Recommend HHPT at d/c to continue to optimize independence with mobility. Patient will benefit from skilled intervention to address the above impairments. Patients rehabilitation potential is considered to be Good Factors which may influence rehabilitation potential include:  
[]         None noted 
[]         Mental ability/status []         Medical condition 
[]         Home/family situation and support systems 
[]         Safety awareness 
[]         Pain tolerance/management 
[]         Other: PLAN : 
Recommendations and Planned Interventions: 
[]           Bed Mobility Training             []    Neuromuscular Re-Education []           Transfer Training                   []    Orthotic/Prosthetic Training 
[]           Gait Training                         []    Modalities []           Therapeutic Exercises           []    Edema Management/Control 
[]           Therapeutic Activities            []    Patient and Family Training/Education 
[]           Other (comment): Frequency/Duration: Patient will be followed by physical therapy twice daily to address goals. Discharge Recommendations: Home Health Further Equipment Recommendations for Discharge: rolling walker ordered SUBJECTIVE:  
Patient stated You told on me!  regarding initial refusal of therapy OBJECTIVE DATA SUMMARY:  
HISTORY:   
Past Medical History:  
Diagnosis Date  Arthritis  Hypercholesteremia  Thyroid disease ENLARGED THYROID Past Surgical History:  
Procedure Laterality Date Dani Lara [facility] Right [id number] ELBOW SURGERY Prior Level of Function/Home Situation: Independent, works for [employer]; wife to support at all times upon d/c home, has 18 stairs to bedroom and plans to stay on second floor once he gets home and up the stairs Personal factors and/or comorbidities impacting plan of care:  PMH Home Situation Home Environment: Private residence # Steps to Enter: 3 Rails to Enter: Yes Hand Rails : Left One/Two Story Residence: Two story # of Interior Steps: 25 Interior Rails: Both Lift Chair Available: No 
Living Alone: No 
 Support Systems: Spouse/Significant Other/Partner Patient Expects to be Discharged to[de-identified] Private residence Current DME Used/Available at Home: NoneEXAMINATION/PRESENTATION/DECISION MAKING: Critical Behavior: 
Neurologic State: Alert Orientation Level: Oriented X4 Cognition: Follows commands Hearing: Auditory Auditory Impairment: NoneRange Of Motion: 
AROM: Within functional limits(exception R knee) Strength:   
Strength: Within functional limits Tone & Sensation:  
Tone: Normal 
Sensation: Impaired(d/t anesthesia ) Coordination: 
Coordination: Within functional limits Functional Mobility: 
Bed Mobility: 
Supine to Sit: Stand-by assistance Sit to Supine: Stand-by assistance Scooting: Contact guard assistance Transfers: 
Sit to Stand: Minimum assistance Stand to Sit: Contact guard assistance Balance:  
Sitting: Intact Standing: Intact; With supportAmbulation/Gait Training:Distance (ft): 50 Feet (ft) Assistive Device: Walker, rolling;Gait belt Ambulation - Level of Assistance: Contact guard assistance Gait Abnormalities: Antalgic;Decreased step clearance; Step to gait Right Side Weight Bearing: As tolerated Base of Support: Shift to left Stance: Right decreased Speed/Willa: Pace decreased (<100 feet/min) Step Length: Right shortened;Left shortened Swing Pattern: Right asymmetrical 
 
Functional Measure: 
Tinetti test: 
 
Sitting Balance: 1 Arises: 0 Attempts to Rise: 0 Immediate Standing Balance: 1 Standing Balance: 1 Nudged: 1 Eyes Closed: 1 Turn 360 Degrees - Continuous/Discontinuous: 0 Turn 360 Degrees - Steady/Unsteady: 1 Sitting Down: 1 Balance Score: 7 Indication of Gait: 1 
R Step Length/Height: 0 
L Step Length/Height: 0 
R Foot Clearance: 1 L Foot Clearance: 1 Step Symmetry: 0 Step Continuity: 1 Path: 1 Trunk: 0 Walking Time: 0 Gait Score: 5 Total Score: 12 Tinetti Tool Score Risk of Falls 
<19 = High Fall Risk 19-24 = Moderate Fall Risk 25-28 = Low Fall Risk Tinetti [place]. Performance-Oriented Assessment of Mobility Problems in Elderly Patients. [facility] 66; [id number]. (Scoring Description: PT Bulletin Feb. 10, 1993) Older adults: Michele Abreu et al, 2009; n = [address] elderly evaluated with ABC, TRELL, ADL, and IADL) · Mean TRELL score for males aged 69-68 years = 26.21(3.40) · Mean TRELL score for females age 69-68 years = 25.16(4.30) · Mean TRELL score for males over 80 years = 23.29(6.02) · Mean TRELL score for females over 80 years = 17.20(8.32) Physical Therapy Evaluation Charge Determination History Examination Presentation Decision-Making HIGH Complexity :3+ comorbidities / personal factors will impact the outcome/ POC  HIGH Complexity : 4+ Standardized tests and measures addressing body structure, function, activity limitation and / or participation in recreation  LOW Complexity : Stable, uncomplicated  Other outcome measures Tinetti 12/28  MEDIUM Based on the above components, the patient evaluation is determined to be of the following complexity level: LOW Pain: 
Pain Scale 1: Numeric (0 - 10) Pain Intensity 1: 10 
Pain Location 1: Knee Pain Orientation 1: Right Pain Description 1: Aching Pain Intervention(s) 1: Medication (see MAR) Activity Tolerance:  
VSS, mobilized well despite pain Please refer to the flowsheet for vital signs taken during this treatment. After treatment:  
[]         Patient left in no apparent distress sitting up in chair 
[x]         Patient left in no apparent distress in bed 
[x]         Call bell left within reach [x]         Nursing notified 
[x]         Caregiver present 
[]         Bed alarm activated COMMUNICATION/EDUCATION:  
The patients plan of care was discussed with: Registered Nurse and PA. [x]         Fall prevention education was provided and the patient/caregiver indicated understanding.  
[x]         Patient/family have participated as able in goal setting and plan of care. [x]         Patient/family agree to work toward stated goals and plan of care. []         Patient understands intent and goals of therapy, but is neutral about his/her participation. []         Patient is unable to participate in goal setting and plan of care. Thank you for this referral. 
Belinda Chan, PT, DPT Time Calculation: 19 mins

## 2019-03-04 NOTE — BRIEF OP NOTE
BRIEF OPERATIVE NOTE Date of Procedure: 3/4/2019 Preoperative Diagnosis: Osteoarthritis of right knee, unspecified osteoarthritis type [M17.11] Postoperative Diagnosis: Osteoarthritis of right knee, unspecified osteoarthritis type [M17.11] Procedure(s): RIGHT TOTAL KNEE REPLACEMENT  (CHOICE) Surgeon(s) and Role: Shama Lopez MD - Primary Surgical Assistant: Assistant: GIULIA Gurrola Surgical Staff: 
Circ-1: Karishma Bazzi RN Physician Assistant: Lauro Cunha PA-C Scrub Tech-1: Artice Running Retractor Richey: Alberta Stokes Float Staff: Chase Keating Event Time In Time Out Incision Start 0582 Incision Close 6933 Anesthesia: spinal 
Estimated Blood Loss: 100cc Specimens: * No specimens in log * Findings: DJD Complications: none Implants:  
Implant Name Type Inv. Item Serial No.  Lot No. LRB No. Used Action CEMENT BNE SMARTSET HV 40GM -- ORDER IN SETS OF 20 - SNA  CEMENT BNE SMARTSET HV 40GM -- ORDER IN SETS OF 20 NA [facility] [employer] [id number] Right 1 Implanted CEMENT BNE SMARTSET HV 40GM -- ORDER IN SETS OF 20 - SNA  CEMENT BNE SMARTSET HV 40GM -- ORDER IN SETS OF 20 NA [facility] [employer] [id number] Right 1 Implanted BASEPLT TIB STEM PC NEXGN 7 --  - SNA  BASEPLT TIB STEM PC NEXGN 7 --  NA [employer] [id number] Right 1 Implanted PLUG STEM TAPR NEXGEN --  - SNA  PLUG STEM TAPR NEXGEN --  NA Letty Davila [id number] Right 1 Implanted FEM KNE LPS-FLEX OPT SZ G-RT -- NEXGEN ZIMALOY - SNA  FEM KNE LPS-FLEX OPT SZ G-RT -- NEXGEN ZIMALOY NA [employer] [id number] Right 1 Implanted PAT INST NXGN 41MM POLYETH --  - SNA  PAT INST NXGN 41MM POLYETH --  NA [employer] [id number] Right 1 Implanted INSERT TIB LPSFLX GH 7-10 10MM -- NEXGEN PROLONG ARTC SURF - SNA  INSERT TIB LPSFLX GH 7-10 10MM -- NEXGEN PROLONG ARTC SURF NA Letty Davila [id number] Right 1 Implanted

## 2019-03-04 NOTE — ANESTHESIA PROCEDURE NOTES
Spinal Block Start time: 3/4/2019 7:31 AM 
End time: 3/4/2019 7:36 AM 
Performed by: Jaquelin Varela MD 
Authorized by: Jaquelin Varela MD  
 
Pre-procedure: Indications: at surgeon's request  Preanesthetic Checklist: patient identified, risks and benefits discussed, site marked, patient being monitored and timeout performed Spinal Block:  
Patient Position:  Seated Prep Region:  Lumbar Prep: DuraPrep Location:  L2-3 Technique:  Single shot Needle:  
Needle Type:  Pencan Needle Gauge:  24 G Attempts:  1 Events: CSF confirmed, no blood with aspiration and no paresthesia Assessment: 
Insertion:  Uncomplicated Patient tolerance:  Patient tolerated the procedure well with no immediate complications

## 2019-03-04 NOTE — PERIOP NOTES
TRANSFER - OUT REPORT: 
 
Verbal report given to [facility] RN(name) on Roque Calles  being transferred to [address] [place] [address](unit) for routine post - op Report consisted of patients Situation, Background, Assessment and  
Recommendations(SBAR). Time Pre op antibiotic given:Y Anesthesia Stop time: Y Lindsey Present on Transfer to floor:Y Order for Lindsey on Chart:Y Discharge Prescriptions with Chart:N Information from the following report(s) SBAR was reviewed with the receiving nurse. Opportunity for questions and clarification was provided. Is the patient on 02? NO 
     L/Min Other Is the patient on a monitor? NO Is the nurse transporting with the patient? NO Surgical Waiting Area notified of patient's transfer from PACU? YES The following personal items collected during your admission accompanied patient upon transfer:  
Dental Appliance: Dental Appliances: Other (comment)(crowns top front teeth) Vision:   
Hearing Aid:   
Jewelry:   
Clothing: Clothing: Other (comment)(clothing) Other Valuables:   
Valuables sent to safe:

## 2019-03-04 NOTE — PROGRESS NOTES
TRANSFER - IN REPORT: 
 
Verbal report received from Jenifer mohan (name) on Vanessa Meredith  being received from [employer]) for routine post - op Report consisted of patients Situation, Background, Assessment and  
Recommendations(SBAR). Information from the following report(s) SBAR, Kardex, Intake/Output and MAR was reviewed with the receiving nurse. Opportunity for questions and clarification was provided. Assessment completed upon patients arrival to unit and care assumed.

## 2019-03-04 NOTE — PROGRESS NOTES
The Rehabilitation department at [address] [place] [address] has ordered the following durable medical equipment (DME):  
rolling walker From: 
[employer] [phone] If the rehab department or DME company is waiting for insurance approval for the equipment and the patient decides to discharge from the hospital before the medical equipment arrives, the patient may contact the company above to work out the delivery. Please keep in mind that some DME companies WILL NOT deliver to the home. Insurance companies and DME companies are not open on the weekends to approve authorization and deliver to the hospital. Therefore it is the patient's responsibility to figure out a way to access the DME medical equipment. Thank you so much for your help as we provide the equipment the patient requires. Ordered by Rajesh Alberts.

## 2019-03-04 NOTE — ANESTHESIA PROCEDURE NOTES
Peripheral Block    Start time: 3/4/2019 6:58 AM  End time: 3/4/2019 7:04 AM  Performed by: Venice Goldmann, MD  Authorized by: Venice Goldmann, MD       Pre-procedure: Indications: at surgeon's request and post-op pain management    Preanesthetic Checklist: patient identified, risks and benefits discussed, site marked, timeout performed and patient being monitored      Block Type:   Block Type:   Adductor canal  Laterality:  Right  Monitoring:  Standard ASA monitoring, continuous pulse ox, heart rate, frequent vital sign checks, oxygen and responsive to questions  Injection Technique:  Single shot  Procedures: ultrasound guided    Patient Position: supine  Prep: chlorhexidine    Location:  Mid thigh  Needle Type:  Stimuplex  Needle Gauge:  22 G  Needle Localization:  Ultrasound guidance    Assessment:  Number of attempts:  1  Injection Assessment:  Incremental injection every 5 mL, local visualized surrounding nerve on ultrasound, negative aspiration for blood, no intravascular symptoms, ultrasound image on chart and no paresthesia  Patient tolerance:  Patient tolerated the procedure well with no immediate complications

## 2019-03-04 NOTE — OP NOTES
3/4/2019  OPERATIVE REPORT      PREOPERATIVE DIAGNOSIS: Osteoarthritis, right knee. POSTOPERATIVE DIAGNOSIS: Osteoarthritis, right knee. OPERATIVE PROCEDURE: right total knee replacement. SURGEON: Saman Davila MD    ASSISTANT SURGEON: Nelia Baez, [facility]    ANESTHESIA: Spinal.    INDICATIONS: : A 46 y.o.  male  with end stage osteoarthritis to the right knee, not responsive to conservative management. COMPLICATIONS:None    PROCEDURE: The patient was taken to the operating room, underwent  placement of spinal anesthesia. The knee and leg were prepped and  draped in the usual fashion. The leg was exsanguinated with the Esmarch  bandage and tourniquet inflated to 350 mmHg. A longitudinal midline  incision made down through skin and subcutaneous tissue. A medial  parapatellar arthrotomy was performed, and extended proximally along the  medial border of the quadriceps tendon, distally along the medial border of  the insertion of the patella tendon. Medial release was performed. Retropatellar fat pad was sharply excised. The patella was subluxated  laterally, the knee was flexed to 90 degrees. Drill was used to enter the  intramedullary canal of the distal femur. The 5 degree intramedullary guide  was applied and the distal femoral cut was performed. The femur was sized  to a G. A G cutting block was applied, and the anterior, posterior,  chamfer cuts were performed. The posterior stabilized cut was performed. The extramedullary tibial guide was applied, and the tibia was cut in  neutral alignment. The tibia was sized to a 7. Knee was balanced to varus  and valgus stress. Flexion and extension gaps were equal. Trial reduction  was performed, using 10 mm insert. Excellent range of motion and stability  were noted. The patella was everted, and the patella was cut using freehand  technique. Patella was sized to a 41. Drill holes were placed, and patella  button applied.  The patella tracked normally. All trial components were  removed, and surfaces were prepared using drill and punch. All residual  meniscal tissue was sharply excised. Hemostasis was obtained using Bovie  apparatus. All components were cemented in place, taking care to remove all  excess cement. The knee was maintained in full extension while the cement  hardened. The tourniquet was let down after a total tourniquet time of 58  minutes. Hemostasis was obtained using the Bovie apparatus. The joint was  extensively irrigated with antibiotic solution. The arthrotomy was repaired  using #2 Vicryl in interrupted figure-of-eight fashion. Subcutaneous tissue  was approximated using 2-0 Vicryl in interrupted fashion. Skin edges were  approximated using stapling apparatus. Joint was infiltrated with 30 mL of  0.5% Marcaine with epinephrine. Bulky sterile dressing was applied, and the  patient was transferred to recovery room in stable condition. There were no  Complications. The Physician assistant was involved in retraction and wound closure. ESTIMATED BLOOD LOSS: 100 cc.     SPECIMEN: Bone      Eyad Quintana M.D.  3/4/2019  9:34 AM

## 2019-03-04 NOTE — PROGRESS NOTES
Ortho Post-Op Note 3/4/2019 
1:57 PM 
 
POD:  Day of Surgery S/P:  Procedure(s): RIGHT TOTAL KNEE REPLACEMENT- Spinal 8 mg Afebrile/BP's running low, A&O x 3 Doing well without complaints of nausea Pain well controlled- says its \"10 out of 10.\" Calves soft/NTTP Bilaterally Incision OK, no drainage or dehiscence. Dressing clean and dry Moving lower extremities well. Neurocirculatory exam intact and within normal range. Lab Results Component Value Date/Time HGB 12.0 (L) 02/04/2019 09:13 AM  
 INR 1.1 02/04/2019 09:13 AM  
 
Recent Labs 02/04/19 
9681 CREA 0.96 BUN 15 Estimated Creatinine Clearance: 108.9 mL/min (based on SCr of 0.96 mg/dL). PLAN: OOB, ambulate with PT 
DVT prophylaxis: Xarelto WBAT with PT-mobilization Pain Control- tylenol, mobic, oxycodone, tramadol. Ice Plan to D/C home with HH/PT possibly later tomorrow SIMON Jaime

## 2019-03-04 NOTE — ANESTHESIA POSTPROCEDURE EVALUATION
Post-Anesthesia Evaluation and Assessment Patient: Slim Masterson MRN: [id number]  SSN: [id number] YOB: 1965  Age: 46 y.o. Sex: male I have evaluated the patient and they are stable and ready for discharge from the PACU. Cardiovascular Function/Vital Signs Visit Vitals /62 Pulse 62 Temp 36.6 °C (97.8 °F) Resp 15 Ht 5' 9\" (1.753 m) Wt 112.6 kg (248 lb 4 oz) SpO2 93% BMI 36.66 kg/m² Patient is status post Other anesthesia for Procedure(s): RIGHT TOTAL KNEE REPLACEMENT  (CHOICE). Nausea/Vomiting: None Postoperative hydration reviewed and adequate. Pain: 
Pain Scale 1: Numeric (0 - 10) (03/04/19 1031) Pain Intensity 1: 0 (03/04/19 1031) Managed Neurological Status:  
Neuro (WDL): Within Defined Limits(feet tingling) (03/04/19 1031) Neuro Neurologic State: Drowsy; Appropriate for age (03/04/19 1031) LUE Motor Response: Purposeful (03/04/19 1031) LLE Motor Response: Purposeful (03/04/19 1031) RUE Motor Response: Purposeful (03/04/19 1031) RLE Motor Response: Purposeful (03/04/19 1031) Block resolving Mental Status, Level of Consciousness: Alert and  oriented to person, place, and time Pulmonary Status:  
O2 Device: Room air (03/04/19 1031) Adequate oxygenation and airway patent Complications related to anesthesia: None Post-anesthesia assessment completed. No concerns Signed By: Pushpa Nobles MD   
 March 4, 2019 Procedure(s): RIGHT TOTAL KNEE REPLACEMENT  (CHOICE). <BSHSIANPOST> Visit Vitals /62 Pulse 62 Temp 36.6 °C (97.8 °F) Resp 15 Ht 5' 9\" (1.753 m) Wt 112.6 kg (248 lb 4 oz) SpO2 93% BMI 36.66 kg/m²

## 2019-03-04 NOTE — ANESTHESIA PREPROCEDURE EVALUATION
Anesthetic History No history of anesthetic complications Review of Systems / Medical History Patient summary reviewed, nursing notes reviewed and pertinent labs reviewed Pulmonary Within defined limits Neuro/Psych Within defined limits Cardiovascular Exercise tolerance: >4 METS 
  
GI/Hepatic/Renal 
Within defined limits Endo/Other Hypothyroidism Obesity and arthritis Other Findings Physical Exam 
 
Airway Mallampati: II 
TM Distance: > 6 cm Neck ROM: normal range of motion Mouth opening: Normal 
 
 Cardiovascular Regular rate and rhythm,  S1 and S2 normal,  no murmur, click, rub, or gallop Dental 
 
Dentition: Caps/crowns Pulmonary Breath sounds clear to auscultation Abdominal 
GI exam deferred Other Findings Anesthetic Plan ASA: 3 Anesthesia type: spinal 
 
 
Post-op pain plan if not by surgeon: peripheral nerve block single Anesthetic plan and risks discussed with: Patient

## 2019-03-04 NOTE — PROGRESS NOTES
Physical Therapy 
3.4.19 Order received, chart reviewed. Attempted PT evaluation with patient however he deferred mobility at this time due to pain. Patient with active DF in supine but states it doesn't feel \"normal.\" Per RN, patient received oxy 10, morphine 2mg, and tylenol 1000 since arriving on unit within the last 1.5 hours. PA aware as well. F/u later as able for PT evaluation. Thank you.  
 
Margy Burns, PT, DPT

## 2019-03-04 NOTE — PROGRESS NOTES
Occupational Therapy Orders received, chart review completed. Note patient POD #0 from R TKA. Per pathway, OT will follow up on POD #1 for evaluation. Recommend OOB to chair three times a day for meals and self-completion of ADLs as able and medically stable. Thank you. Conner Cortes MS, OTR/L

## 2019-03-04 NOTE — PROGRESS NOTES
Primary Nurse Alycia Huston, TONYA and Dimitri Chung NP, RN performed a dual skin assessment on this patient No impairment noted Nawaf score is

## 2019-03-05 VITALS
HEART RATE: 70 BPM | DIASTOLIC BLOOD PRESSURE: 77 MMHG | WEIGHT: 248.25 LBS | HEIGHT: 69 IN | SYSTOLIC BLOOD PRESSURE: 129 MMHG | BODY MASS INDEX: 36.77 KG/M2 | TEMPERATURE: 97.5 F | RESPIRATION RATE: 16 BRPM | OXYGEN SATURATION: 95 %

## 2019-03-05 LAB
ANION GAP SERPL CALC-SCNC: 10 MMOL/L (ref 5–15)
BUN SERPL-MCNC: 16 MG/DL (ref 6–20)
BUN/CREAT SERPL: 16 (ref 12–20)
CALCIUM SERPL-MCNC: 7.9 MG/DL (ref 8.5–10.1)
CHLORIDE SERPL-SCNC: 107 MMOL/L (ref 97–108)
CO2 SERPL-SCNC: 22 MMOL/L (ref 21–32)
CREAT SERPL-MCNC: 0.99 MG/DL (ref 0.7–1.3)
GLUCOSE SERPL-MCNC: 138 MG/DL (ref 65–100)
HGB BLD-MCNC: 8.7 G/DL (ref 12.1–17)
POTASSIUM SERPL-SCNC: 4 MMOL/L (ref 3.5–5.1)
SODIUM SERPL-SCNC: 139 MMOL/L (ref 136–145)

## 2019-03-05 PROCEDURE — 97165 OT EVAL LOW COMPLEX 30 MIN: CPT

## 2019-03-05 PROCEDURE — 74011250637 HC RX REV CODE- 250/637: Performed by: PHYSICIAN ASSISTANT

## 2019-03-05 PROCEDURE — 97530 THERAPEUTIC ACTIVITIES: CPT

## 2019-03-05 PROCEDURE — 80048 BASIC METABOLIC PNL TOTAL CA: CPT

## 2019-03-05 PROCEDURE — 36415 COLL VENOUS BLD VENIPUNCTURE: CPT

## 2019-03-05 PROCEDURE — 97116 GAIT TRAINING THERAPY: CPT

## 2019-03-05 PROCEDURE — 74011250636 HC RX REV CODE- 250/636: Performed by: PHYSICIAN ASSISTANT

## 2019-03-05 PROCEDURE — 85018 HEMOGLOBIN: CPT

## 2019-03-05 PROCEDURE — 97535 SELF CARE MNGMENT TRAINING: CPT

## 2019-03-05 RX ORDER — AMOXICILLIN 250 MG
1 CAPSULE ORAL 2 TIMES DAILY
Qty: 20 TAB | Refills: 0 | Status: SHIPPED | OUTPATIENT
Start: 2019-03-05

## 2019-03-05 RX ORDER — ACETAMINOPHEN 500 MG
1000 TABLET ORAL EVERY 6 HOURS
Qty: 120 TAB | Refills: 0 | Status: SHIPPED
Start: 2019-03-05

## 2019-03-05 RX ORDER — MELOXICAM 15 MG/1
15 TABLET ORAL DAILY
Qty: 30 TAB | Refills: 0 | Status: SHIPPED | OUTPATIENT
Start: 2019-03-06

## 2019-03-05 RX ORDER — OXYCODONE HYDROCHLORIDE 5 MG/1
5-10 TABLET ORAL
Qty: 50 TAB | Refills: 0 | Status: SHIPPED | OUTPATIENT
Start: 2019-03-05 | End: 2019-03-08

## 2019-03-05 RX ADMIN — ALLOPURINOL 400 MG: 300 TABLET ORAL at 08:49

## 2019-03-05 RX ADMIN — ATORVASTATIN CALCIUM 10 MG: 10 TABLET, FILM COATED ORAL at 08:49

## 2019-03-05 RX ADMIN — Medication 2 G: at 00:02

## 2019-03-05 RX ADMIN — COLCHICINE 0.6 MG: 0.6 TABLET, FILM COATED ORAL at 08:48

## 2019-03-05 RX ADMIN — POLYETHYLENE GLYCOL 3350 17 G: 17 POWDER, FOR SOLUTION ORAL at 08:48

## 2019-03-05 RX ADMIN — RIVAROXABAN 10 MG: 10 TABLET, FILM COATED ORAL at 11:29

## 2019-03-05 RX ADMIN — SENNOSIDES AND DOCUSATE SODIUM 1 TABLET: 8.6; 5 TABLET ORAL at 08:48

## 2019-03-05 RX ADMIN — OXYCODONE HYDROCHLORIDE 10 MG: 5 TABLET ORAL at 05:11

## 2019-03-05 RX ADMIN — OXYCODONE HYDROCHLORIDE 10 MG: 5 TABLET ORAL at 00:51

## 2019-03-05 RX ADMIN — SENNOSIDES AND DOCUSATE SODIUM 1 TABLET: 8.6; 5 TABLET ORAL at 17:53

## 2019-03-05 RX ADMIN — OXYCODONE HYDROCHLORIDE 10 MG: 5 TABLET ORAL at 14:43

## 2019-03-05 RX ADMIN — ACETAMINOPHEN 1000 MG: 500 TABLET ORAL at 13:46

## 2019-03-05 RX ADMIN — SODIUM CHLORIDE 125 ML/HR: 900 INJECTION, SOLUTION INTRAVENOUS at 08:58

## 2019-03-05 RX ADMIN — ACETAMINOPHEN 1000 MG: 500 TABLET ORAL at 07:39

## 2019-03-05 RX ADMIN — OXYCODONE HYDROCHLORIDE 10 MG: 5 TABLET ORAL at 07:39

## 2019-03-05 RX ADMIN — OXYCODONE HYDROCHLORIDE 10 MG: 5 TABLET ORAL at 11:29

## 2019-03-05 RX ADMIN — ACETAMINOPHEN 1000 MG: 500 TABLET ORAL at 00:51

## 2019-03-05 RX ADMIN — OXYCODONE HYDROCHLORIDE 10 MG: 5 TABLET ORAL at 17:54

## 2019-03-05 RX ADMIN — FAMOTIDINE 20 MG: 20 TABLET ORAL at 17:53

## 2019-03-05 RX ADMIN — FAMOTIDINE 20 MG: 20 TABLET ORAL at 08:49

## 2019-03-05 RX ADMIN — MELOXICAM 15 MG: 7.5 TABLET ORAL at 08:49

## 2019-03-05 NOTE — DISCHARGE INSTRUCTIONS
After Hospital Care Plan:    Discharge Instructions Knee Replacement-Dr. Azul Corona    Patient Name  Baljeet Xie  Date of procedure  3/4/2019    Procedure  Procedure(s):  RIGHT TOTAL KNEE REPLACEMENT  (CHOICE)  Surgeon  Surgeon(s) and Role:     * Rachna Jack MD - Primary  Date of discharge: No discharge date for patient encounter. PCP: Dale Wood MD    Follow up appointments  -follow up with Dr. Azul Corona in 2 weeks. Call [phone] to make an appointment. Home Health Agency: _________________________   phone: ___________________  The agency will contact you to arrange dates/times for visits. Please call them if you do not hear from them within 24 hours after you are discharged. When to call your Orthopaedic Surgeon:  -unrelieved pain  -Signs of infection-if your incision is red; continues to have drainage; drainage has a foul odor or if you have a persistent fever over 101 degrees  -Signs of a blood clot in your leg-calf pain, tenderness, redness, swelling of lower leg  When to call your Primary Care Physician:  -Concerns about medical conditions such as diabetes, high blood pressure, asthma, congestive heart failure  -Call if blood sugars are elevated, persistent headache or dizziness, coughing or congestion, constipation or diarrhea, burning with urination, abnormal heart rate  When to call 911and go to the nearest emergency room  -acute onset of chest pain, shortness of breath, difficulty breathing    Activity  -weight bearing as tolerated with your walker or crutches. Refer to pages 23-31 of your handbook for instructions and pictures.   -20 repetitions of each exercise as instructed by therapist 3 times each day.   Refer to pages 32-40 of your handbook for exercise instructions and pictures  -get up every one hour and walk (except at night when sleeping)  -do not drive or operate heavy machinery    Incision Care  -you will have staples in your knee incision; they will be removed at the surgeons office visit in 2 weeks  -Keep a dressing (ABD and paper tape) on your incision and change daily. Wash your hands thoroughly before changing the dressing. Once you incision is not draining, you may leave it open to air  -You may shower and get your incision wet but do not submerge your incision under water in a bath tub, hot tub or swimming pool for 6 weeks after surgery. Preventing blood clots  -take Xarelto at 12 noon daily as prescribed by Dr. Barragan Perfect    Pain management  -take pain medication as prescribed; decrease the amount you use as your pain lessens  -avoid alcoholic beverages while taking pain medication  -Please be aware that many medications contain Tylenol. We do not want you to over medicate so please read the information below as a guide. Do not take more than 4 Grams of Tylenol in a 24 hour period. (There are 1000 milligrams in one Gram)    -You may place an ice bag on your knee for 15-20 minutes after exercising    Diet  -resume usual diet; drink plenty of fluids; eat foods high in fiber  -you may want to take a stool softener (such as Senokot-S or Colace) to prevent constipation while you are taking pain medication. If constipation occurs, take a laxative (such as Dulcolax tablets, Milk of Magnesia, or a suppository)    2003 [facility] Protocol ([facility]  Nursing-per physicians order  -complete head to toe assessment, vital signs  -staples will be removed in the surgeons office at 2 week visit  -medication reconciliation  -review pain management  -manage chronic medical conditions    Physical Therapy-per physicians order  Weight bearing status:  Precautions at Admission: Fall, WBAT     Right Side Weight Bearing: As tolerated  ?   Mobility Status:  Supine to Sit: Stand-by assistance  Sit to Stand: Minimum assistance  Sit to Supine: Stand-by assistance     Gait:  Distance (ft): 50 Feet (ft)  Ambulation - Level of Assistance: Contact guard assistance  Assistive Device: Walker, rolling, Gait belt  Gait Abnormalities: Antalgic, Decreased step clearance, Step to gait  ADL status overall composite:                -Home Therapy  -assessment and evaluation-bed mobility; functional transfers (bed, chair, bathroom, stairs); ambulation with equipment, car transfers, shower transfers, safety and ability to get out of house in the event of an emergency  -review weight bearing as tolerated, wean from walker or crutches as tolerated  -discuss pain management  -review how to do ADLs    -Home Exercise Program-refer to [id number] of the patient handbook for exercises  -supine exercises-ankle pumps, hamstring sets, quad sets, heel slides, short arc quad sets, long arc quads-prop heel on pillow for stretch, straight leg raise-sitting exercises-active knee flexion, passive knee flexion, active knee extension, ankle pumps, bicep curls (use weights as appropriate), triceps pushups, shoulder flexion (use weights as appropriate)  -standing exercises holding onto supportive counter-toe raises, heel raises, mini-squats, heel/toe touches with knee bend, marching in place, hamstring curls

## 2019-03-05 NOTE — PROGRESS NOTES
Ortho Progress Note  1 Day Post-Op  Procedure(s):  RIGHT TOTAL KNEE REPLACEMENT  (CHOICE)    Subjective: Pt doing much better today, pain is well controlled. Pt ready to work with PT and hoping to discharge home today. Denies CP, SOB, lightheadedness, dizziness, or abdominal pain, pt reports flatus. Physical Exam:   Visit Vitals  /72 (BP 1 Location: Right arm, BP Patient Position: At rest)   Pulse 76   Temp 98.2 °F (36.8 °C)   Resp 16   Ht 5' 9\" (1.753 m)   Wt 112.6 kg (248 lb 4 oz)   SpO2 96%   BMI 36.66 kg/m²     General appearance: alert, cooperative, no distress, appears stated age  Abdomen: soft, non-tender. Bowel sounds normal. No masses,  no organomegaly  Extremities: Homans sign is negative, no sign of DVT, limited ROM R knee 2/2 pain and bulky dressing, calf is soft and nontender, no bruising, mild thigh pain with palpation at tourniquet site, minimal swelling RLE  Pulses: 2+ and symmetric  Wound: bulky dressing c/d/i, no evidence of drainage  Neurologic: Grossly normal, ankle dorsi/plantar flexion intact, heel raise intact    Recent Results (from the past 24 hour(s))   HEMOGLOBIN    Collection Time: 03/05/19  2:36 AM   Result Value Ref Range    HGB 8.7 (L) 12.1 - 84.8 g/dL   METABOLIC PANEL, BASIC    Collection Time: 03/05/19  2:36 AM   Result Value Ref Range    Sodium 139 136 - 145 mmol/L    Potassium 4.0 3.5 - 5.1 mmol/L    Chloride 107 97 - 108 mmol/L    CO2 22 21 - 32 mmol/L    Anion gap 10 5 - 15 mmol/L    Glucose 138 (H) 65 - 100 mg/dL    BUN 16 6 - 20 MG/DL    Creatinine 0.99 0.70 - 1.30 MG/DL    BUN/Creatinine ratio 16 12 - 20      GFR est AA >60 >60 ml/min/1.73m2    GFR est non-AA >60 >60 ml/min/1.73m2    Calcium 7.9 (L) 8.5 - 10.1 MG/DL       Assessment:  Stable Post Op    Plan:  DVT Prophylaxis:Xarelto x 2 weeks  Physical Therapy: WBAT (pt amb 50' 3/4)  Discharge Planning: Pt will require extensive PT prior to discharge home due to pain, gait instability, and limited social support. Home with home health, today if cleared by PT                  F/u in office in 2 weeks  Pain control: tylenol, mobic, oxycodone 10  Acute blood loss anemia: hgb 8.7, normal post op finding, pt asymptomatic, will continue to monitor  Obesity: BMI 36.6, chronic, encourage OOB for all meals, use of IS, good pulmonary toilet, may limit pt's ability to progress with PT/OT

## 2019-03-05 NOTE — PROGRESS NOTES
Problem: Mobility Impaired (Adult and Pediatric)  Goal: *Acute Goals and Plan of Care (Insert Text)  Physical Therapy Goals  Initiated 3/4/2019    1. Patient will move from supine to sit and sit to supine , scoot up and down and roll side to side in bed with modified independence within 4 days. 2. Patient will perform sit to stand with modified independence within 4 days. 3. Patient will ambulate with modified independence for 150 feet with the least restrictive device within 4 days. 4. Patient will ascend/descend 18 stairs with bilateral handrail(s) with contact guard assist within 4 days. 5. Patient will perform home exercise program per protocol with independence within 4 days. 6. Patient will demonstrate AROM 0-90 degrees in operative joint within 4 days. Outcome: Progressing Towards Goal  physical Therapy TREATMENT  Patient: Swati Currie (89 y.o. male)  Date: 3/5/2019  Diagnosis: Osteoarthritis of right knee, unspecified osteoarthritis type [M17.11]  Primary osteoarthritis of right knee [M17.11] Primary osteoarthritis of right knee  Procedure(s) (LRB):  RIGHT TOTAL KNEE REPLACEMENT  (CHOICE) (Right) 1 Day Post-Op  Precautions: Fall, WBAT  Chart, physical therapy assessment, plan of care and goals were reviewed. ASSESSMENT:  Pt received in bed. Finally agreeable to PT after previous attempts this afternoon. Pt continues to c/o of 7-8/10 pain despite icing,elevtion, and pain meds. C/o pain in upper thigh as well as swelling. Pt completed 4 steps again this afternoon using both steps. Reports confidence in his ability to complete stairs to 2nd fl once home. Returned to bed w/all items in reach. Demonstrates SBA-CGA for functional transfers and CGA for gait. Pt at this point very determined to return home. RN aware of pt current pain level. Pt ready for d/c home from PT standpoint.          Progression toward goals:  [x]      Improving appropriately and progressing toward goals  [] Improving slowly and progressing toward goals  []      Not making progress toward goals and plan of care will be adjusted     PLAN:  Patient continues to benefit from skilled intervention to address the above impairments. Continue treatment per established plan of care. Discharge Recommendations:  Home Health  Further Equipment Recommendations for Discharge:  RW delivered     SUBJECTIVE:   \"My first instinct is for me to go home and get in my own bed. \"    OBJECTIVE DATA SUMMARY:   Range of Motion:  AROM: Within functional limits  PROM: Within functional limits                    Functional Mobility Training:  Bed Mobility:     Supine to Sit: Stand-by assistance(+gait belt)  Sit to Supine: Stand-by assistance(+ gait belt)  Scooting: Supervision        Transfers:  Sit to Stand: Contact guard assistance  Stand to Sit: Contact guard assistance        Bed to Chair: Contact guard assistance                    Ambulation/Gait Training:  Distance (ft): 80 Feet (ft)  Assistive Device: Gait belt;Walker, rolling  Ambulation - Level of Assistance: Contact guard assistance        Gait Abnormalities: Antalgic;Decreased step clearance; Step to gait        Base of Support: Shift to left; Widened  Stance: Right decreased  Speed/Willa: Pace decreased (<100 feet/min); Slow  Step Length: Left shortened;Right shortened  Swing Pattern: Right asymmetrical     Interventions: Safety awareness training          Stairs:  Number of Stairs Trained: 4  Stairs - Level of Assistance: Contact guard assistance  Rail Use: Both  Therapeutic Exercises:   Exercises performed per protocol. See morning treatment note for description.   Pain:  Pain Scale 1: Numeric (0 - 10)  Pain Intensity 1: 3  Pain Location 1: Knee  Pain Orientation 1: Right  Pain Description 1: Aching;Constant  Pain Intervention(s) 1: Medication (see MAR)  Activity Tolerance:   Continues to report increased pain (7-8/10)  Please refer to the flowsheet for vital signs taken during this treatment.   After treatment:   [] Patient left in no apparent distress sitting up in chair  [x] Patient left in no apparent distress in bed  [x] Call bell left within reach  [x] Nursing notified  [] Caregiver present  [] Bed alarm activated    COMMUNICATION/COLLABORATION:   The patients plan of care was discussed with: Registered Nurse    Sandro Birch PTA   Time Calculation: 23 mins

## 2019-03-05 NOTE — PROGRESS NOTES
Problem: Self Care Deficits Care Plan (Adult)  Goal: *Acute Goals and Plan of Care (Insert Text)  Occupational Therapy Goals  Initiated: 3/5/2019   1. Patient will perform grooming with supervision/set-up standing at sink within 3 day(s). 2.  Patient will perform bathing with supervision/set-up from chair within 3 day(s). 3.  Patient will perform upper body dressing and lower body dressing with supervision/set-up within 3 day(s). 4.  Patient will perform toilet transfers with supervision/set-up within 3 day(s). 5.  Patient will perform all aspects of toileting with supervision/set-up within 3 day(s). Occupational Therapy EVALUATION  Patient: Slim Masterson (89 y.o. male)  Date: 3/5/2019  Primary Diagnosis: Osteoarthritis of right knee, unspecified osteoarthritis type [M17.11]  Primary osteoarthritis of right knee [M17.11]  Procedure(s) (LRB):  RIGHT TOTAL KNEE REPLACEMENT  (CHOICE) (Right) 1 Day Post-Op   Precautions:   Fall, WBAT    ASSESSMENT :  Based on the objective data described below, the patient presents with decreased independence with self care and functional mobility following admission for R TKR. He was able to complete dressing and bathing from chair level with mod A provided from wife. He is not yet walking to and from bathroom due to increased pain with activity. Pt tolerated sitting in chair for about 30 minutes and then returned to bed due to elevated pain. Recommend discharge home with family once pain better controlled. Anticipate mobility to improve greatly and functional activities once his pain is better controlled. Patient will benefit from skilled intervention to address the above impairments.   Patients rehabilitation potential is considered to be Good  Factors which may influence rehabilitation potential include:   [x]             None noted  []             Mental ability/status  []             Medical condition  []             Home/family situation and support systems  []             Safety awareness  []             Pain tolerance/management  []             Other:      PLAN :  Recommendations and Planned Interventions:  [x]               Self Care Training                  [x]        Therapeutic Activities  [x]               Functional Mobility Training    []        Cognitive Retraining  [x]               Therapeutic Exercises           [x]        Endurance Activities  [x]               Balance Training                   []        Neuromuscular Re-Education  []               Visual/Perceptual Training     [x]   Home Safety Training  [x]               Patient Education                 [x]        Family Training/Education  []               Other (comment):    Frequency/Duration: Patient will be followed by occupational therapy 5 times a week to address goals. Discharge Recommendations: None  Further Equipment Recommendations for Discharge: none     SUBJECTIVE:   Patient stated I am willing to try.     OBJECTIVE DATA SUMMARY:   HISTORY:   Past Medical History:   Diagnosis Date    Arthritis     Hypercholesteremia     Thyroid disease     ENLARGED THYROID     Past Surgical History:   Procedure Laterality Date   [facility] ORTHOPAEDIC Right [id number]    ELBOW SURGERY       Prior Level of Function/Environment/Context: pt was independent at home. Pt does continue to work full time. He works for [employer]. He plans to recover in the next 6ish weeks.     Expanded or extensive additional review of patient history:     Home Situation  Home Environment: Private residence  # Steps to Enter: 3  Rails to Enter: Yes  Hand Rails : Left  One/Two Story Residence: Two story  # of Interior Steps: 25  Interior Rails: Both  Lift Chair Available: No  Living Alone: No  Support Systems: Spouse/Significant Other/Partner  Patient Expects to be Discharged to[de-identified] Private residence  Current DME Used/Available at Home: Walker, rolling    Hand dominance: Right    EXAMINATION OF PERFORMANCE DEFICITS:  Cognitive/Behavioral Status:  Neurologic State: Alert  Orientation Level: Oriented X4  Cognition: Appropriate for age attention/concentration  Perception: Appears intact  Perseveration: No perseveration noted  Safety/Judgement: Good awareness of safety precautions    Skin: ace wrap in place; see nursing notes    Edema: none noted    Hearing: Auditory  Auditory Impairment: None    Vision/Perceptual:                           Acuity: Within Defined Limits         Range of Motion:    AROM: Within functional limits  PROM: Within functional limits                      Strength:    Strength: Within functional limits                Coordination:  Coordination: Within functional limits  Fine Motor Skills-Upper: Right Intact; Left Intact    Gross Motor Skills-Upper: Right Intact; Left Intact    Tone & Sensation:    Tone: Normal  Sensation: Intact                      Balance:  Sitting: Intact  Standing: Intact; With support    Functional Mobility and Transfers for ADLs:  Bed Mobility:  Supine to Sit: Supervision  Sit to Supine: Supervision  Scooting: Supervision    Transfers:  Sit to Stand: Contact guard assistance  Stand to Sit: Contact guard assistance  Bed to Chair: Contact guard assistance  Toilet Transfer : Contact guard assistance    ADL Assessment:  Feeding: Supervision    Oral Facial Hygiene/Grooming: Supervision    Bathing: Moderate assistance(wife assisted with bathing tasks)    Upper Body Dressing: Supervision    Lower Body Dressing: Moderate assistance    Toileting: Contact guard assistance                ADL Intervention and task modifications:   Pt completed bathing and dressing activities from chair level. Pt did well with assistance from his wife as needed for bathing and dressing activities. Pt provided training for reaching and accessing lower body for dressing which he reports went well.          Cognitive Retraining  Safety/Judgement: Good awareness of safety precautions    Functional Measure:  Barthel Index:    Bathin  Bladder: 10  Bowels: 10  Groomin  Dressin  Feeding: 10  Mobility: 0  Stairs: 0  Toilet Use: 5  Transfer (Bed to Chair and Back): 10  Total: 55/100        Percentage of impairment   0%   1-19%   20-39%   40-59%   60-79%   80-99%   100%   Barthel Score 0-100 100 99-80 79-60 59-40 20-39 1-19   0     The Barthel ADL Index: Guidelines  1. The index should be used as a record of what a patient does, not as a record of what a patient could do. 2. The main aim is to establish degree of independence from any help, physical or verbal, however minor and for whatever reason. 3. The need for supervision renders the patient not independent. 4. A patient's performance should be established using the best available evidence. Asking the patient, friends/relatives and nurses are the usual sources, but direct observation and common sense are also important. However direct testing is not needed. 5. Usually the patient's performance over the preceding 24-48 hours is important, but occasionally longer periods will be relevant. 6. Middle categories imply that the patient supplies over 50 per cent of the effort. 7. Use of aids to be independent is allowed. Paz Jones., Barthel, D.W. (1258). Functional evaluation: the Barthel Index. [facility] (14)2. AUTUMN Zapien, Eva Palma., Alexandra Partida., [place], [address] [place] [address] (). Measuring the change indisability after inpatient rehabilitation; comparison of the responsiveness of the Barthel Index and Functional Ord Measure. Journal of Neurology, Neurosurgery, and Psychiatry, 66(4), 383-338. Deejay Hillman, N.J.A, FIFI Tomas.RAKESH, & Sara Foss M.A. (2004.) Assessment of post-stroke quality of life in cost-effectiveness studies: The usefulness of the Barthel Index and the EuroQoL-5D.  Quality of Life Research, 15, 110-25       Occupational Therapy Evaluation Charge Determination   History Examination Decision-Making LOW Complexity : Brief history review  MEDIUM Complexity : 3-5 performance deficits relating to physical, cognitive , or psychosocial skils that result in activity limitations and / or participation restrictions MEDIUM Complexity : Patient may present with comorbidities that affect occupational performnce. Miniml to moderate modification of tasks or assistance (eg, physical or verbal ) with assesment(s) is necessary to enable patient to complete evaluation       Based on the above components, the patient evaluation is determined to be of the following complexity level: LOW   Pain:  Pain Scale 1: Numeric (0 - 10)  Pain Intensity 1: 2  Pain Location 1: Knee  Pain Orientation 1: Right  Pain Description 1: Aching  Pain Intervention(s) 1: Medication (see MAR)  Activity Tolerance:   VSS throughout session. After treatment:   [] Patient left in no apparent distress sitting up in chair  [x] Patient left in no apparent distress in bed  [x] Call bell left within reach  [x] Nursing notified  [] Caregiver present  [] Bed alarm activated    COMMUNICATION/EDUCATION:   The patients plan of care was discussed with: Physical Therapist and Registered Nurse. [x] Home safety education was provided and the patient/caregiver indicated understanding. [] Patient/family have participated as able in goal setting and plan of care. [x] Patient/family agree to work toward stated goals and plan of care. [] Patient understands intent and goals of therapy, but is neutral about his/her participation. [] Patient is unable to participate in goal setting and plan of care. This patients plan of care is appropriate for delegation to [facility].     Thank you for this referral.  Prakash Munguia OT  Time Calculation: 58 mins

## 2019-03-05 NOTE — DISCHARGE SUMMARY
Orthopedic Service Discharge Summary    Patient ID:  Swati Currie  [id number]  male  46 y.o.  1965    Admit date: 3/4/2019    Discharge date and time: 3/5/2019  6:01 PM     Admitting Physician: Fior Ortez MD     Discharge Physician: Fior Ortez MD    Consulting Physician(s): Treatment Team: Care Manager: Gale Cope; Utilization Review: Lei Reynolds RN    Date of Surgery: 3/4/2019     Preoperative Diagnosis:  Osteoarthritis of right knee, unspecified osteoarthritis type [M17.11]    Postoperative Diagnosis: Osteoarthritis of right knee, unspecified osteoarthritis type [M17.11]    Procedure(s): Procedure(s):  RIGHT TOTAL KNEE REPLACEMENT  (CHOICE)    Surgeon: Judy Hanson and Role:     Ana Laura Rob MD - Primary      Anesthesia:  Other    Preoperative Medical Clearance:                           HPI:  Pt is a 46 y.o. male who has a history of Osteoarthritis of right knee, unspecified osteoarthritis type [M17.11]  Primary osteoarthritis of right knee [M17.11]  with pain and limitations of activities of daily living who presents at this time for a right TKR following the failure of conservative management. PMH:   Past Medical History:   Diagnosis Date    Arthritis     Hypercholesteremia     Thyroid disease     ENLARGED THYROID       Medications upon admission :   Prior to Admission Medications   Prescriptions Last Dose Informant Patient Reported? Taking? Chlorphenir-Phenylephrn-Aspirn (SANDY-SELTZER PLUS COLD, PE,) 2-7.8-325 mg tbef 2/18/2019  Yes No   Sig: Take 1 Tab by mouth daily as needed. allopurinol (ZYLOPRIM) 300 mg tablet 3/3/2019 at Unknown time  Yes Yes   Sig: Take 400 mg by mouth daily. atorvastatin (LIPITOR) 10 mg tablet 3/3/2019 at Unknown time  Yes Yes   Sig: Take 10 mg by mouth daily. colchicine 0.6 mg tablet 3/3/2019 at Unknown time  Yes Yes   Sig: Take 0.12 mg by mouth daily.    phenylephrine (NEOSYNEPHRINE) 0.5 % spry 3/4/2019 at Unknown time Yes Yes   Si Sprays by Both Nostrils route every six (6) hours as needed. predniSONE (DELTASONE) 5 mg tablet 2019  Yes No   Sig: Take 5 mg by mouth daily as needed. red yeast rice extract 600 mg cap 2019 at Unknown time  Yes Yes   Sig: Take 600 mg by mouth now. Facility-Administered Medications: None        Allergies:  No Known Allergies     Hospital Course: The patient underwent surgery. Complications:  None; patient tolerated the procedure well. Was taken to the PACU in stable condition and then transferred to the Orthopedics floor. Condition on discharge: good    Perioperative Antibiotics:  Ancef     Postoperative Pain Management:  Acetaminophen, mobic, oxycodone    DVT Prophylaxis: Xarelto 10, SCDs    Postoperative transfusions:     none Banked PRBCs     Post Op complications: none    Hemoglobin at discharge:    Lab Results   Component Value Date/Time    HGB 8.7 (L) 2019 02:36 AM    INR 1.1 2019 09:13 AM       Dressing was changed on POD # 2. Incision - clean, dry and intact. No significant erythema or swelling. Neurovascular exam within normal limits. Wound appears to be healing without any evidence of infection. Physical Therapy started on the day following surgery and progressed to ambulation with the aid of a rolling walker for distances of 240 feet with supervision. Range of motion  limited by pain. At the time of discharge, able to go up and down stairs and had understanding of precautions needed following surgery. Discharged to: Home with Home Health. Discharge instructions:  -See Full Summary of discharge instructions attached  -Anticoagulate with Xarelto  -Resume pre hospital diet            -Resume home medications   Discharge Medication List as of 3/5/2019  3:23 PM      START taking these medications    Details   acetaminophen (TYLENOL) 500 mg tablet Take 2 Tabs by mouth every six (6) hours. , No Print, Disp-120 Tab, R-0      meloxicam (MOBIC) 15 mg tablet Take 1 Tab by mouth daily. , Print, Disp-30 Tab, R-0      oxyCODONE IR (ROXICODONE) 5 mg immediate release tablet Take 1-2 Tabs by mouth every four (4) hours as needed for Pain for up to 3 days. Max Daily Amount: 60 mg., Print, Disp-50 Tab, R-0      rivaroxaban (XARELTO) 10 mg tablet Take 1 Tab by mouth daily (with lunch). , Print, Disp-14 Tab, R-0      senna-docusate (PERICOLACE) 8.6-50 mg per tablet Take 1 Tab by mouth two (2) times a day., Print, Disp-20 Tab, R-0         CONTINUE these medications which have NOT CHANGED    Details   phenylephrine (NEOSYNEPHRINE) 0.5 % spry 2 Sprays by Both Nostrils route every six (6) hours as needed., Historical Med      colchicine 0.6 mg tablet Take 0.12 mg by mouth daily. , Historical Med      allopurinol (ZYLOPRIM) 300 mg tablet Take 400 mg by mouth daily. , Historical Med      red yeast rice extract 600 mg cap Take 600 mg by mouth now., Historical Med      atorvastatin (LIPITOR) 10 mg tablet Take 10 mg by mouth daily. , Historical Med      predniSONE (DELTASONE) 5 mg tablet Take 5 mg by mouth daily as needed., Historical Med      Chlorphenir-Phenylephrn-Aspirn (SANDY-SELTZER PLUS COLD, PE,) 2-7.8-325 mg tbef Take 1 Tab by mouth daily as needed., Historical Med          per medical continuation form  -Discharge activity: Activity as tolerated  -Ambulate with Walkers, Type: Rolling Walker, appropriate total joint protocol  -Wound Care Keep wound clean and dry, Reinforce dressing PRN, Ice to area for comfort and As directed  -Follow up in office in 2 weeks      Signed:  Jim Candelario PA-C  3/6/2019  9:14 AM        No att. providers found

## 2019-03-05 NOTE — PROGRESS NOTES
Followed up w/ pt for afternoon PT. Pt deferring at this time d/t pain. Ice packs refilled and RLE elevated on pillow while maintaining extension. Will check back at later time this afternoon as able and appropriate.      Lazara Means, PTA

## 2019-03-05 NOTE — PROGRESS NOTES
Bedside and Verbal shift change report given to 7 Susana Cummings (oncoming nurse) by Betty Dickinson RN (offgoing nurse). Report included the following information SBAR, Kardex and MAR.

## 2019-03-05 NOTE — PROGRESS NOTES
Care Management Interventions  PCP Verified by CM: Yes  Mode of Transport at Discharge: Other (see comment)(family)  Transition of Care Consult (CM Consult): Home Health, Discharge Oscar [place] [phone] [address]: No  Reason Outside [place]: Unable to staff case  Discharge Durable Medical Equipment: No  Physical Therapy Consult: Yes  Occupational Therapy Consult: Yes  Speech Therapy Consult: No  Current Support Network: Lives with Spouse, Own Home  Confirm Follow Up Transport: Family  Plan discussed with Pt/Family/Caregiver: Yes  Freedom of Choice Offered: Yes  [place] Resource Information Provided?: No  Discharge Location  Discharge Placement: Home with home health    Reason for Admission: total knee replacement                     RRAT Score:    5                 Plan for utilizing home health: [facility]                    Likelihood of Readmission:  low                         Transition of Care Plan:    Home with home health. Referral sent to [facility], they are unable to accept. Referral sent to [facility]. [facility] has accepted patient.     Will Leonardo, BSW/CRM

## 2019-03-05 NOTE — PROGRESS NOTES
Problem: Mobility Impaired (Adult and Pediatric)  Goal: *Acute Goals and Plan of Care (Insert Text)  Physical Therapy Goals  Initiated 3/4/2019    1. Patient will move from supine to sit and sit to supine , scoot up and down and roll side to side in bed with modified independence within 4 days. 2. Patient will perform sit to stand with modified independence within 4 days. 3. Patient will ambulate with modified independence for 150 feet with the least restrictive device within 4 days. 4. Patient will ascend/descend 18 stairs with bilateral handrail(s) with contact guard assist within 4 days. 5. Patient will perform home exercise program per protocol with independence within 4 days. 6. Patient will demonstrate AROM 0-90 degrees in operative joint within 4 days. Outcome: Progressing Towards Goal  physical Therapy TREATMENT  Patient: Jenny Shirley (19 y.o. male)  Date: 3/5/2019  Diagnosis: Osteoarthritis of right knee, unspecified osteoarthritis type [M17.11]  Primary osteoarthritis of right knee [M17.11] Primary osteoarthritis of right knee  Procedure(s) (LRB):  RIGHT TOTAL KNEE REPLACEMENT  (CHOICE) (Right) 1 Day Post-Op  Precautions: Fall, WBAT  Chart, physical therapy assessment, plan of care and goals were reviewed. ASSESSMENT:  Pt received in bed. C/o 8/10 pain at rest but agreeable to PT at this time. Pt required SBA-CGA for functional transfers and CGA for gait x 80FT(x2) using RW. amb w/ step to,antalgic gait; no buckling or LOB noted. Pt did require brief standing rest break x1 d/t pain. Completed 4 steps this AM w/ CGA, no safety concerns. Per pt report,pain beginning to decrease, rating pain as 7/10 w/ amb back to room. Returned to bed to rest at this time vs up in chair. Pt requesting additional PT this afternoon. Anticipate pt ready for d/c home after PM session. Reviewed HEP, icing, 45 minute-hourly position change.        Progression toward goals:  [x]      Improving appropriately and progressing toward goals  []      Improving slowly and progressing toward goals  []      Not making progress toward goals and plan of care will be adjusted     PLAN:  Patient continues to benefit from skilled intervention to address the above impairments. Continue treatment per established plan of care. Discharge Recommendations:  Home Health  Further Equipment Recommendations for Discharge:  RW delivered (fitted to correct height)     SUBJECTIVE:   Patient stated Jose Armando Arias we doing? Fredochanceita Sandhu    OBJECTIVE DATA SUMMARY:   Critical Behavior:  Neurologic State: Alert  Orientation Level: Oriented X4  Cognition: Appropriate for age attention/concentration  Safety/Judgement: Good awareness of safety precautions  Range of Motion:  AROM: Within functional limits  PROM: Within functional limits                    Functional Mobility Training:  Bed Mobility:     Supine to Sit: Stand-by assistance;Setup; Adaptive equipment(gait belt for RLE support in/OOB)  Sit to Supine: Stand-by assistance;Setup; Adaptive equipment(gait belt for RLE support)  Scooting: Supervision        Transfers:  Sit to Stand: Contact guard assistance  Stand to Sit: Contact guard assistance        Bed to Chair: Contact guard assistance                    Balance:  Sitting: Intact  Standing: Intact; With support  Ambulation/Gait Training:  Distance (ft): 80 Feet (ft)  Assistive Device: Gait belt;Walker, rolling  Ambulation - Level of Assistance: Contact guard assistance        Gait Abnormalities: Antalgic;Decreased step clearance; Step to gait        Base of Support: Shift to left; Widened  Stance: Right decreased  Speed/Willa: Pace decreased (<100 feet/min)  Step Length: Left shortened;Right shortened  Swing Pattern: Right asymmetrical     Interventions: Safety awareness training          Stairs:  Number of Stairs Trained: 4  Stairs - Level of Assistance: Contact guard assistance     Therapeutic Exercises: Pain Scale 1: Numeric (0 - 10)  Pain Intensity 1: 9  Pain Location 1: Knee  Pain Orientation 1: Right  Pain Description 1: Aching  Pain Intervention(s) 1: Medication (see MAR)  Activity Tolerance:   Pain reported as 7-/10 pain. Please refer to the flowsheet for vital signs taken during this treatment.   After treatment:   [] Patient left in no apparent distress sitting up in chair  [x] Patient left in no apparent distress in bed  [x] Call bell left within reach  [x] Nursing notified  [x] Caregiver present  [] Bed alarm activated    COMMUNICATION/COLLABORATION:   The patients plan of care was discussed with: Registered Nurse    Alyssa BirchPTA   Time Calculation: 32 mins

## 2019-03-05 NOTE — PROGRESS NOTES
Bedside and Verbal shift change report given to Stefanie (oncoming nurse) by Tanner Choe (offgoing nurse). Report included the following information SBAR, Kardex, OR Summary and MAR.

## 2019-06-26 ENCOUNTER — HOSPITAL ENCOUNTER (EMERGENCY)
Age: 54
Discharge: HOME | End: 2019-06-26
Attending: EMERGENCY MEDICINE
Payer: COMMERCIAL

## 2019-06-26 ENCOUNTER — APPOINTMENT (OUTPATIENT)
Dept: GENERAL RADIOLOGY | Age: 54
End: 2019-06-26
Attending: PHYSICIAN ASSISTANT
Payer: COMMERCIAL

## 2019-06-26 VITALS
OXYGEN SATURATION: 97 % | SYSTOLIC BLOOD PRESSURE: 127 MMHG | RESPIRATION RATE: 19 BRPM | DIASTOLIC BLOOD PRESSURE: 90 MMHG | TEMPERATURE: 98.3 F | HEART RATE: 78 BPM

## 2019-06-26 DIAGNOSIS — M54.2 NECK PAIN ON LEFT SIDE: ICD-10-CM

## 2019-06-26 DIAGNOSIS — M54.50 ACUTE LOW BACK PAIN WITHOUT SCIATICA, UNSPECIFIED BACK PAIN LATERALITY: ICD-10-CM

## 2019-06-26 DIAGNOSIS — M25.562 ACUTE PAIN OF BOTH KNEES: ICD-10-CM

## 2019-06-26 DIAGNOSIS — M25.561 ACUTE PAIN OF BOTH KNEES: ICD-10-CM
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
## 2019-06-26 PROCEDURE — 73562 X-RAY EXAM OF KNEE 3: CPT

## 2019-06-26 PROCEDURE — 73030 X-RAY EXAM OF SHOULDER: CPT

## 2019-06-26 PROCEDURE — 72050 X-RAY EXAM NECK SPINE 4/5VWS: CPT

## 2019-06-26 PROCEDURE — 72100 X-RAY EXAM L-S SPINE 2/3 VWS: CPT

## 2019-06-26 PROCEDURE — 99282 EMERGENCY DEPT VISIT SF MDM: CPT

## 2019-06-26 RX ORDER — METHOCARBAMOL 750 MG/1
750 TABLET, FILM COATED ORAL
Qty: 10 TAB | Refills: 0 | Status: SHIPPED | OUTPATIENT
Start: 2019-06-26

## 2019-06-26 RX ORDER — LIDOCAINE 50 MG/G
PATCH TOPICAL
Qty: 15 EACH | Refills: 0 | Status: SHIPPED | OUTPATIENT
Start: 2019-06-26
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
## 2019-06-27 NOTE — DISCHARGE INSTRUCTIONS
Patient Education        Back Pain: Care Instructions  Your Care Instructions    Back pain has many possible causes. It is often related to problems with muscles and ligaments of the back. It may also be related to problems with the nerves, discs, or bones of the back. Moving, lifting, standing, sitting, or sleeping in an awkward way can strain the back. Sometimes you don't notice the injury until later. Arthritis is another common cause of back pain. Although it may hurt a lot, back pain usually improves on its own within several weeks. Most people recover in 12 weeks or less. Using good home treatment and being careful not to stress your back can help you feel better sooner. Follow-up care is a key part of your treatment and safety. Be sure to make and go to all appointments, and call your doctor if you are having problems. It's also a good idea to know your test results and keep a list of the medicines you take. How can you care for yourself at home? · Sit or lie in positions that are most comfortable and reduce your pain. Try one of these positions when you lie down:  ? Lie on your back with your knees bent and supported by large pillows. ? Lie on the floor with your legs on the seat of a sofa or chair. ? Lie on your side with your knees and hips bent and a pillow between your legs. ? Lie on your stomach if it does not make pain worse. · Do not sit up in bed, and avoid soft couches and twisted positions. Bed rest can help relieve pain at first, but it delays healing. Avoid bed rest after the first day of back pain. · Change positions every 30 minutes. If you must sit for long periods of time, take breaks from sitting. Get up and walk around, or lie in a comfortable position. · Try using a heating pad on a low or medium setting for 15 to 20 minutes every 2 or 3 hours. Try a warm shower in place of one session with the heating pad. · You can also try an ice pack for 10 to 15 minutes every 2 to 3 hours. Put a thin cloth between the ice pack and your skin. · Take pain medicines exactly as directed. ? If the doctor gave you a prescription medicine for pain, take it as prescribed. ? If you are not taking a prescription pain medicine, ask your doctor if you can take an over-the-counter medicine. · Take short walks several times a day. You can start with 5 to 10 minutes, 3 or 4 times a day, and work up to longer walks. Walk on level surfaces and avoid hills and stairs until your back is better. · Return to work and other activities as soon as you can. Continued rest without activity is usually not good for your back. · To prevent future back pain, do exercises to stretch and strengthen your back and stomach. Learn how to use good posture, safe lifting techniques, and proper body mechanics. When should you call for help? Call your doctor now or seek immediate medical care if:    · You have new or worsening numbness in your legs.     · You have new or worsening weakness in your legs. (This could make it hard to stand up.)     · You lose control of your bladder or bowels.    Watch closely for changes in your health, and be sure to contact your doctor if:    · You have a fever, lose weight, or don't feel well.     · You do not get better as expected. Where can you learn more? Go to http://chepe-latrice.info/. Enter F112 in the search box to learn more about \"Back Pain: Care Instructions. \"  Current as of: September 20, 2018  Content Version: 11.9  © 5407-1185 [employer]. Care instructions adapted under license by [employer] (which disclaims liability or warranty for this information). If you have questions about a medical condition or this instruction, always ask your healthcare professional. [place] [address] any warranty or liability for your use of this information.

## 2020-11-25 ENCOUNTER — TRANSCRIBE ORDER (OUTPATIENT)
Dept: SCHEDULING | Age: 55
End: 2020-11-25

## 2020-11-25 DIAGNOSIS — J32.4 CHRONIC PANSINUSITIS: Primary | ICD-10-CM

## 2021-04-21 ENCOUNTER — TRANSCRIBE ORDER (OUTPATIENT)
Dept: SCHEDULING | Age: 56
End: 2021-04-21

## 2021-04-21 DIAGNOSIS — J32.4 CHRONIC PANSINUSITIS: Primary | ICD-10-CM

## 2021-04-27 ENCOUNTER — HOSPITAL ENCOUNTER (OUTPATIENT)
Dept: CT IMAGING | Age: 56
Discharge: HOME | End: 2021-04-27
Payer: COMMERCIAL

## 2021-04-27 DIAGNOSIS — J32.4 CHRONIC PANSINUSITIS: ICD-10-CM

## 2021-04-27 PROCEDURE — 70486 CT MAXILLOFACIAL W/O DYE: CPT | Performed by: OTOLARYNGOLOGY

## 2022-03-19 PROBLEM — M17.11 PRIMARY OSTEOARTHRITIS OF RIGHT KNEE: Status: ACTIVE | Noted: 2019-03-04

## 2023-05-18 RX ORDER — CRANBERRY FRUIT EXTRACT 200 MG
600 CAPSULE ORAL
COMMUNITY

## 2023-05-18 RX ORDER — COLCHICINE 0.6 MG/1
0.12 TABLET ORAL DAILY
COMMUNITY

## 2023-05-18 RX ORDER — MELOXICAM 15 MG/1
15 TABLET ORAL DAILY
COMMUNITY
Start: 2019-03-06

## 2023-05-18 RX ORDER — PREDNISONE 5 MG/1
5 TABLET ORAL DAILY PRN
COMMUNITY

## 2023-05-18 RX ORDER — ATORVASTATIN CALCIUM 10 MG/1
10 TABLET, FILM COATED ORAL DAILY
COMMUNITY

## 2023-05-18 RX ORDER — LIDOCAINE 50 MG/G
PATCH TOPICAL
COMMUNITY
Start: 2019-06-26

## 2023-05-18 RX ORDER — ALLOPURINOL 300 MG/1
400 TABLET ORAL DAILY
COMMUNITY

## 2023-05-18 RX ORDER — METHOCARBAMOL 750 MG/1
750 TABLET, FILM COATED ORAL
COMMUNITY
Start: 2019-06-26

## 2023-05-18 RX ORDER — ACETAMINOPHEN 500 MG
1000 TABLET ORAL EVERY 6 HOURS
COMMUNITY
Start: 2019-03-05

## 2023-05-18 RX ORDER — AMOXICILLIN 250 MG
1 CAPSULE ORAL 2 TIMES DAILY
COMMUNITY
Start: 2019-03-05

## (undated) DEVICE — SOLUTION IRRIG 1000ML H2O STRL BLT

## (undated) DEVICE — STERILE POLYISOPRENE POWDER-FREE SURGICAL GLOVES WITH EMOLLIENT COATING: Brand: PROTEXIS

## (undated) DEVICE — PADDING CST 6IN STERILE --

## (undated) DEVICE — T4 HOOD

## (undated) DEVICE — SUTURE STRATAFIX SYMMETRIC PDS + SZ 1 L18IN ABSRB VLT L48MM SXPP1A400

## (undated) DEVICE — SOLUTION IRRIG 3000ML 0.9% SOD CHL FLX CONT 0797208] ICU MEDICAL INC]

## (undated) DEVICE — CARTRIDGE BNE CEM MIX UNIV TWR VAC ROTOR BRK OFF NOZ W/O

## (undated) DEVICE — HOOK LOCK LATEX FREE ELASTIC BANDAGE D/L 6INX10YD

## (undated) DEVICE — NEEDLE HYPO 21GA L1.5IN INTRAMUSCULAR S STL LATCH BVL UP

## (undated) DEVICE — 4-PORT MANIFOLD: Brand: NEPTUNE 2

## (undated) DEVICE — BLADE SAW W051XL276IN THK005IN CUT THK005IN REPL SAG FLR

## (undated) DEVICE — DEVON™ KNEE AND BODY STRAP 60" X 3" (1.5 M X 7.6 CM): Brand: DEVON

## (undated) DEVICE — SCRUB DRY SURG EZ SCRUB BRUSH PREOPERATIVE GRN

## (undated) DEVICE — BLADE SAW W083XL354IN THK0047IN CUT THK0047IN SAG FLR

## (undated) DEVICE — SLIM BODY SKIN STAPLER: Brand: APPOSE ULC

## (undated) DEVICE — SYRINGE MED 20ML STD CLR PLAS LUERLOCK TIP N CTRL DISP

## (undated) DEVICE — (D)PREP SKN CHLRAPRP APPL 26ML -- CONVERT TO ITEM 371833

## (undated) DEVICE — CONTAINER,SPECIMEN,3OZ,OR STRL: Brand: MEDLINE

## (undated) DEVICE — SUTURE VCRL SZ 2-0 L36IN ABSRB UD L40MM CT 1/2 CIR J957H

## (undated) DEVICE — INFECTION CONTROL KIT SYS

## (undated) DEVICE — STERILE POLYISOPRENE POWDER-FREE SURGICAL GLOVES: Brand: PROTEXIS

## (undated) DEVICE — Device

## (undated) DEVICE — REM POLYHESIVE ADULT PATIENT RETURN ELECTRODE: Brand: VALLEYLAB

## (undated) DEVICE — SYR LR LCK 1ML GRAD NSAF 30ML --

## (undated) DEVICE — ZIMMER® STERILE DISPOSABLE TOURNIQUET CUFF WITH PLC, DUAL PORT, SINGLE BLADDER, 34 IN. (86 CM)

## (undated) DEVICE — HANDPIECE SET WITH BONE CLEANING TIP AND SUCTION TUBE: Brand: INTERPULSE

## (undated) DEVICE — TRAY CATH 16F URIN MTR LTX -- CONVERT TO ITEM 363111